# Patient Record
Sex: MALE | Race: WHITE | NOT HISPANIC OR LATINO | Employment: FULL TIME | URBAN - METROPOLITAN AREA
[De-identification: names, ages, dates, MRNs, and addresses within clinical notes are randomized per-mention and may not be internally consistent; named-entity substitution may affect disease eponyms.]

---

## 2023-02-21 LAB
AMPHETAMINE (PRESENCE) IN URINE BY SCREEN METHOD: ABNORMAL
BARBITURATES PRESENCE IN URINE BY SCREEN METHOD: ABNORMAL
BENZODIAZEPINE (PRESENCE) IN URINE BY SCREEN METHOD: ABNORMAL
CANNABINOIDS IN URINE BY SCREEN METHOD: ABNORMAL
COCAINE (PRESENCE) IN URINE BY SCREEN METHOD: ABNORMAL
DRUG SCREEN COMMENT URINE: ABNORMAL
FENTANYL URINE: ABNORMAL
METHADONE (PRESENCE) IN URINE BY SCREEN METHOD: ABNORMAL
OPIATES (PRESENCE) IN URINE BY SCREEN METHOD: ABNORMAL
OXYCODONE (PRESENCE) IN URINE BY SCREEN METHOD: ABNORMAL
PHENCYCLIDINE (PRESENCE) IN URINE BY SCREEN METHOD: ABNORMAL

## 2023-02-27 LAB
AMPHETAMINES,URINE: 2059 NG/ML
MDA,URINE: <200 NG/ML
MDEA,URINE: <200 NG/ML
MDMA,UR: <200 NG/ML
METHAMPHETAMINE QUANTITATIVE URINE: <200 NG/ML
PHENTERMINE,UR: <200 NG/ML

## 2023-11-11 PROBLEM — F17.210 CIGARETTE NICOTINE DEPENDENCE WITHOUT COMPLICATION: Status: ACTIVE | Noted: 2023-11-11

## 2023-11-11 PROBLEM — G93.40 ENCEPHALOPATHY: Status: ACTIVE | Noted: 2023-11-11

## 2023-11-11 PROBLEM — F17.200 CURRENT EVERY DAY SMOKER: Status: ACTIVE | Noted: 2023-11-11

## 2023-11-11 PROBLEM — S82.132A CLOSED FRACTURE OF MEDIAL PLATEAU OF LEFT TIBIA: Status: ACTIVE | Noted: 2023-11-11

## 2023-11-11 PROBLEM — E66.3 OVERWEIGHT WITH BODY MASS INDEX (BMI) OF 25 TO 25.9 IN ADULT: Status: ACTIVE | Noted: 2023-11-11

## 2023-11-11 PROBLEM — I10 BENIGN ESSENTIAL HYPERTENSION: Status: ACTIVE | Noted: 2023-11-11

## 2023-11-11 PROBLEM — G47.00 INSOMNIA: Status: ACTIVE | Noted: 2023-11-11

## 2023-11-11 PROBLEM — F41.1 GENERALIZED ANXIETY DISORDER: Status: ACTIVE | Noted: 2023-11-11

## 2023-11-11 PROBLEM — S32.409D: Status: ACTIVE | Noted: 2023-11-11

## 2023-11-11 PROBLEM — F98.8 ADD (ATTENTION DEFICIT DISORDER): Status: ACTIVE | Noted: 2023-11-11

## 2023-11-11 PROBLEM — F90.0 ATTENTION DEFICIT HYPERACTIVITY DISORDER (ADHD), PREDOMINANTLY INATTENTIVE TYPE: Status: ACTIVE | Noted: 2023-11-11

## 2023-11-11 RX ORDER — DOCUSATE SODIUM 100 MG/1
100 CAPSULE, LIQUID FILLED ORAL 2 TIMES DAILY
COMMUNITY
Start: 2018-05-27 | End: 2024-02-23 | Stop reason: WASHOUT

## 2023-11-11 RX ORDER — ACETAMINOPHEN 500 MG
1 TABLET ORAL NIGHTLY
COMMUNITY
Start: 2017-04-03

## 2023-11-11 RX ORDER — TEMAZEPAM 15 MG/1
1-2 CAPSULE ORAL NIGHTLY PRN
COMMUNITY
Start: 2015-11-09 | End: 2024-02-23 | Stop reason: SDUPTHER

## 2023-11-11 RX ORDER — METOPROLOL SUCCINATE 50 MG/1
TABLET, EXTENDED RELEASE ORAL
COMMUNITY
Start: 2023-03-03 | End: 2024-02-19 | Stop reason: SDUPTHER

## 2023-11-11 RX ORDER — PAROXETINE 30 MG/1
1 TABLET, FILM COATED ORAL DAILY
COMMUNITY
Start: 2022-09-21 | End: 2024-02-23 | Stop reason: SDUPTHER

## 2023-11-11 RX ORDER — BISOPROLOL FUMARATE 10 MG/1
1 TABLET, FILM COATED ORAL DAILY
COMMUNITY
Start: 2020-09-10 | End: 2024-02-23 | Stop reason: ALTCHOICE

## 2023-11-11 RX ORDER — DEXTROAMPHETAMINE SACCHARATE, AMPHETAMINE ASPARTATE, DEXTROAMPHETAMINE SULFATE AND AMPHETAMINE SULFATE 5; 5; 5; 5 MG/1; MG/1; MG/1; MG/1
1.5 TABLET ORAL DAILY
COMMUNITY
Start: 2020-11-10 | End: 2023-11-27 | Stop reason: SDUPTHER

## 2023-11-21 RX ORDER — CEFUROXIME AXETIL 250 MG/1
250 TABLET ORAL EVERY 12 HOURS
COMMUNITY
Start: 2023-08-31 | End: 2024-02-23 | Stop reason: WASHOUT

## 2023-11-21 RX ORDER — ONDANSETRON 4 MG/1
4 TABLET, ORALLY DISINTEGRATING ORAL 3 TIMES DAILY
COMMUNITY
Start: 2023-08-29 | End: 2024-02-23 | Stop reason: WASHOUT

## 2023-11-27 ENCOUNTER — OFFICE VISIT (OUTPATIENT)
Dept: NEUROLOGY | Facility: CLINIC | Age: 38
End: 2023-11-27
Payer: COMMERCIAL

## 2023-11-27 VITALS
SYSTOLIC BLOOD PRESSURE: 124 MMHG | BODY MASS INDEX: 27.11 KG/M2 | DIASTOLIC BLOOD PRESSURE: 86 MMHG | WEIGHT: 183 LBS | HEART RATE: 105 BPM | HEIGHT: 69 IN

## 2023-11-27 DIAGNOSIS — F98.8 ADD (ATTENTION DEFICIT DISORDER) WITHOUT HYPERACTIVITY: ICD-10-CM

## 2023-11-27 DIAGNOSIS — F90.0 ATTENTION DEFICIT HYPERACTIVITY DISORDER (ADHD), PREDOMINANTLY INATTENTIVE TYPE: ICD-10-CM

## 2023-11-27 DIAGNOSIS — G47.01 INSOMNIA DUE TO MEDICAL CONDITION: ICD-10-CM

## 2023-11-27 DIAGNOSIS — G93.40 ENCEPHALOPATHY: Primary | ICD-10-CM

## 2023-11-27 DIAGNOSIS — F41.1 GENERALIZED ANXIETY DISORDER: ICD-10-CM

## 2023-11-27 PROCEDURE — 99214 OFFICE O/P EST MOD 30 MIN: CPT | Performed by: PSYCHIATRY & NEUROLOGY

## 2023-11-27 PROCEDURE — 3079F DIAST BP 80-89 MM HG: CPT | Performed by: PSYCHIATRY & NEUROLOGY

## 2023-11-27 PROCEDURE — 3074F SYST BP LT 130 MM HG: CPT | Performed by: PSYCHIATRY & NEUROLOGY

## 2023-11-27 PROCEDURE — 1036F TOBACCO NON-USER: CPT | Performed by: PSYCHIATRY & NEUROLOGY

## 2023-11-27 RX ORDER — DEXTROAMPHETAMINE SACCHARATE, AMPHETAMINE ASPARTATE, DEXTROAMPHETAMINE SULFATE AND AMPHETAMINE SULFATE 5; 5; 5; 5 MG/1; MG/1; MG/1; MG/1
1.5 TABLET ORAL DAILY
Qty: 45 TABLET | Refills: 0 | Status: SHIPPED | OUTPATIENT
Start: 2023-11-27 | End: 2024-02-21 | Stop reason: SDUPTHER

## 2023-11-27 RX ORDER — DEXTROAMPHETAMINE SACCHARATE, AMPHETAMINE ASPARTATE, DEXTROAMPHETAMINE SULFATE AND AMPHETAMINE SULFATE 5; 5; 5; 5 MG/1; MG/1; MG/1; MG/1
30 TABLET ORAL DAILY
Qty: 45 TABLET | Refills: 0 | Status: SHIPPED | OUTPATIENT
Start: 2023-11-27 | End: 2023-11-27 | Stop reason: SDUPTHER

## 2023-11-27 RX ORDER — DEXTROAMPHETAMINE SACCHARATE, AMPHETAMINE ASPARTATE, DEXTROAMPHETAMINE SULFATE AND AMPHETAMINE SULFATE 5; 5; 5; 5 MG/1; MG/1; MG/1; MG/1
30 TABLET ORAL DAILY
Qty: 45 TABLET | Refills: 0 | Status: SHIPPED | OUTPATIENT
Start: 2023-11-27 | End: 2024-02-21 | Stop reason: ALTCHOICE

## 2023-11-27 RX ORDER — DEXTROAMPHETAMINE SACCHARATE, AMPHETAMINE ASPARTATE, DEXTROAMPHETAMINE SULFATE AND AMPHETAMINE SULFATE 5; 5; 5; 5 MG/1; MG/1; MG/1; MG/1
30 TABLET ORAL DAILY
Qty: 45 TABLET | Refills: 0 | Status: SHIPPED | OUTPATIENT
Start: 2023-11-27 | End: 2024-02-21 | Stop reason: SDUPTHER

## 2023-11-27 ASSESSMENT — ENCOUNTER SYMPTOMS
NAUSEA: 0
BACK PAIN: 0
FATIGUE: 0
DIZZINESS: 0
HEADACHES: 0
PALPITATIONS: 0
SLEEP DISTURBANCE: 0
HYPERACTIVE: 0
ARTHRALGIAS: 0
TREMORS: 0
DIFFICULTY URINATING: 0
ABDOMINAL PAIN: 0
COUGH: 0
FREQUENCY: 0
SEIZURES: 0
HALLUCINATIONS: 0
JOINT SWELLING: 0
NECK STIFFNESS: 0
WEAKNESS: 0
BRUISES/BLEEDS EASILY: 0
SPEECH DIFFICULTY: 0
SINUS PRESSURE: 0
FEVER: 0
LIGHT-HEADEDNESS: 0
NECK PAIN: 0
AGITATION: 0
PHOTOPHOBIA: 0
SHORTNESS OF BREATH: 0
CONFUSION: 0
NUMBNESS: 0
FACIAL ASYMMETRY: 0
ADENOPATHY: 0
EYE PAIN: 0
TROUBLE SWALLOWING: 0
UNEXPECTED WEIGHT CHANGE: 0
WHEEZING: 0
VOMITING: 0

## 2023-11-27 ASSESSMENT — PATIENT HEALTH QUESTIONNAIRE - PHQ9
1. LITTLE INTEREST OR PLEASURE IN DOING THINGS: NOT AT ALL
SUM OF ALL RESPONSES TO PHQ9 QUESTIONS 1 & 2: 0
2. FEELING DOWN, DEPRESSED OR HOPELESS: NOT AT ALL

## 2023-11-27 NOTE — PROGRESS NOTES
Navin Rios  38 y.o.       SUBJECTIVE    HPI   Navin saldana he is a 38-year-old young man who was seen today for follow-up of his encephalopathy due to attention deficit disorder with difficulty at work and home.  Since last seen he has done very well on Adderall 30 mg daily and can tell a big difference when he is on medicine compared to off medicine.  No side effects of the medication with today's physical and neurological examination was normal.  I would like to continue the medication the way he is taking and have discussed the controlled substance policy, abusive potential, risk benefit and the precautions to be taken and depending on how he does I might make future recommendation when he comes back to see me in 3 months.  I did review the medication list.      Due to technical limitations of voice recognition and human error, this note may not accurately reflect the care of the patient.    Review of Systems   Constitutional:  Negative for fatigue, fever and unexpected weight change.   HENT:  Negative for dental problem, ear pain, hearing loss, sinus pressure, tinnitus and trouble swallowing.    Eyes:  Negative for photophobia, pain and visual disturbance.   Respiratory:  Negative for cough, shortness of breath and wheezing.    Cardiovascular:  Negative for chest pain, palpitations and leg swelling.   Gastrointestinal:  Negative for abdominal pain, nausea and vomiting.   Genitourinary:  Negative for difficulty urinating, enuresis and frequency.   Musculoskeletal:  Negative for arthralgias, back pain, joint swelling, neck pain and neck stiffness.   Skin:  Negative for pallor and rash.   Allergic/Immunologic: Negative for food allergies.   Neurological:  Negative for dizziness, tremors, seizures, syncope, facial asymmetry, speech difficulty, weakness, light-headedness, numbness and headaches.   Hematological:  Negative for adenopathy. Does not bruise/bleed easily.   Psychiatric/Behavioral:  Negative for  "agitation, behavioral problems, confusion, hallucinations and sleep disturbance. The patient is not hyperactive.         Patient Active Problem List   Diagnosis    ADD (attention deficit disorder)    Attention deficit hyperactivity disorder (ADHD), predominantly inattentive type    Benign essential hypertension    Cigarette nicotine dependence without complication    Closed displaced fracture of acetabulum with routine healing    Closed fracture of medial plateau of left tibia    Current every day smoker    Encephalopathy    Generalized anxiety disorder    Insomnia    Overweight with body mass index (BMI) of 25 to 25.9 in adult     Past Medical History:   Diagnosis Date    Personal history of other mental and behavioral disorders     History of attention deficit hyperactivity disorder (ADHD)     Past Surgical History:   Procedure Laterality Date    OTHER SURGICAL HISTORY  08/09/2016    Oral Surgery       reports that he has quit smoking. His smoking use included cigarettes. He has a 7.50 pack-year smoking history. He has never used smokeless tobacco. Alcohol use questions deferred to the physician. Drug use questions deferred to the physician.    /86   Pulse 105   Ht 1.753 m (5' 9\")   Wt 83 kg (183 lb)   BMI 27.02 kg/m²     OBJECTIVE  Physical Exam/Neurological Exam   Constitutional: General appearance: no acute distress   Auscultation of Heart: Regular rate and rhythm, no murmurs, normal S1 and S2.   Carotid Arteries: Intact without any bruits.   Neck is supple.   No lymph adenopathy.   Peripheral Vascular Exam: Pulses +2 and equal in all extremities. No swelling, varicosities, edema or tenderness to palpations.    Abdomen is soft, nondistended. No organomegaly.  Mental status: The patient was in no distress, alert, interactive and cooperative. Affect is appropriate.   Orientation: oriented to person, oriented to place and oriented to time.   Memory: recent memory intact and remote memory intact. "   Attention: normal attention span and normal concentrating ability.   Language: normal comprehension and no difficulty naming common objects.   Fund of knowledge: Patient displays adequate knowledge of current events, adequate fund of knowledge regarding past history and adequate fund of knowledge regarding vocabulary.   Eyes: The ophthalmoscopic examination was normal. The fundi are visualized with normal disc margins and without.  Cranial nerve II: Visual fields full to confrontation.   Cranial nerves III, IV, and VI: Pupils round, equally reactive to light; no ptosis. EOMs intact. No nystagmus.   Cranial Nerve V: Facial sensation intact bilaterally.   Cranial nerve VII: Normal and symmetric facial strength.   Cranial nerve VIII: Hearing is intact bilaterally to finger rub / whisper.   Cranial nerves IX and X: Palate elevates symmetrically.   Cranial nerve XI: Shoulder shrug and neck rotation strength are intact.   Cranial nerve XII: Tongue midline with normal strength.   Motor: Motor exam was normal. Muscle bulk was normal in both upper and lower extremities. Muscle tone was normal in both upper and lower extremities. Muscle strength was 5/5 throughout. no abnormal or adventitious movements were present.   Deep Tendon Reflexes: left biceps 2+ , right biceps 2+, left triceps 2+, right triceps 2+, left brachioradialis 2+, right brachioradialis 2+, left patella 2+, right patella 2+, left ankle jerk 2+, right ankle jerk 2+   Plantar Reflex: Toes downgoing to plantar stimulation on the left. Toes downgoing to plantar stimulation on the right.   Sensory Exam: Normal to light touch.   Coordination: There is no limb dystaxia and rapid alternating movements are intact.  Gait: Gait is normal without spasticity, ataxia or bradykinesia. Stance is stable with a negative Romberg.      ASSESSMENT/PLAN  Diagnoses and all orders for this visit:  ADD (attention deficit disorder) without hyperactivity  -      amphetamine-dextroamphetamine (Adderall) 20 mg tablet; Take 1.5 tablets (30 mg) by mouth once daily.  am, 1 po 1 pm.  -     amphetamine-dextroamphetamine (Adderall) 20 mg tablet; Take 1.5 tablets (30 mg) by mouth once daily.  -     amphetamine-dextroamphetamine (Adderall) 20 mg tablet; Take 1.5 tablets (30 mg) by mouth once daily.        Jacob Del Rosario MD  11/27/2023  2:44 PM

## 2023-12-02 DIAGNOSIS — G47.00 INSOMNIA, UNSPECIFIED: ICD-10-CM

## 2023-12-04 RX ORDER — TEMAZEPAM 15 MG/1
CAPSULE ORAL
Qty: 60 CAPSULE | Refills: 2 | OUTPATIENT
Start: 2023-12-04

## 2024-02-19 DIAGNOSIS — I10 BENIGN ESSENTIAL HYPERTENSION: ICD-10-CM

## 2024-02-19 DIAGNOSIS — I10 ESSENTIAL (PRIMARY) HYPERTENSION: ICD-10-CM

## 2024-02-19 RX ORDER — METOPROLOL SUCCINATE 50 MG/1
50 TABLET, EXTENDED RELEASE ORAL DAILY
Qty: 30 TABLET | Refills: 0 | Status: SHIPPED | OUTPATIENT
Start: 2024-02-19 | End: 2024-02-23 | Stop reason: SDUPTHER

## 2024-02-19 NOTE — PROGRESS NOTES
Malinda Rios is a 38 y.o.   male.  HPI  The patient is being seen today for their encephalopathy r/t their ADHD. They are currently taking Adderall 20mg BID, 1.5 tabs (30mg) in am and 1 tab (20mg) afternoon and it has been effective. The patient can tell when the medication wears off. The patient agrees that their quality of life has improved while taking this medication. Patient denies any chest pain, heart palpitations, sleep issues, appetite changes, weight loss, and mood changes while taking this medication. Neurological exam is normal. I have reviewed the medications and the chart. Review of systems are negative unless otherwise specified in HPI.    I have personally reviewed the OAR[place] report for this patient. I have considered the risks of abuse, dependence, addiction, and diversion. I believe that it is critically appropriate for this patient to be prescribed this medication based on documented diagnosis of ADHD. An updated contract between the patient and myself was signed, scanned into the EMR, and the patient agrees to the terms. Any deviation from this agreement will result in termination from my practice. CSA was signed and dated. UDS obtained, results are pending.   Objective   Neurological Exam    Reflexes  Deep tendon reflexes are 2+ and symmetric in all four extremities.    Gait  Casual gait is normal including stance, stride, and arm swing.    Physical Exam  Cardiovascular:      Rate and Rhythm: Normal rate.   Neurological:      Deep Tendon Reflexes: Reflexes are normal and symmetric.         Assessment/Plan   Discussed following up in 3 months. Medication was sent to pharmacy. Discussed with the patient the purpose of medication, as well as potential side effects to be aware of. Informed the patient about abuse potential of medication and the importance adhering to the controlled substance agreement. Advised patient to call office with any adverse reaction to medication.

## 2024-02-19 NOTE — TELEPHONE ENCOUNTER
Pt has an appt on Wed. 2/21 and he stated that he is out of medication and his BP has been running high.

## 2024-02-21 ENCOUNTER — OFFICE VISIT (OUTPATIENT)
Dept: NEUROLOGY | Facility: CLINIC | Age: 39
End: 2024-02-21
Payer: COMMERCIAL

## 2024-02-21 VITALS
HEIGHT: 68 IN | WEIGHT: 179 LBS | BODY MASS INDEX: 27.13 KG/M2 | DIASTOLIC BLOOD PRESSURE: 80 MMHG | SYSTOLIC BLOOD PRESSURE: 136 MMHG | HEART RATE: 94 BPM

## 2024-02-21 DIAGNOSIS — F90.9 ATTENTION DEFICIT HYPERACTIVITY DISORDER (ADHD), UNSPECIFIED ADHD TYPE: ICD-10-CM

## 2024-02-21 DIAGNOSIS — G93.40 ENCEPHALOPATHY: Primary | ICD-10-CM

## 2024-02-21 LAB
AMPHETAMINES UR QL SCN: NORMAL
BARBITURATES UR QL SCN: NORMAL
BENZODIAZ UR QL SCN: NORMAL
BZE UR QL SCN: NORMAL
CANNABINOIDS UR QL SCN: NORMAL
FENTANYL+NORFENTANYL UR QL SCN: NORMAL
OPIATES UR QL SCN: NORMAL
OXYCODONE+OXYMORPHONE UR QL SCN: NORMAL
PCP UR QL SCN: NORMAL

## 2024-02-21 PROCEDURE — 3075F SYST BP GE 130 - 139MM HG: CPT

## 2024-02-21 PROCEDURE — 80307 DRUG TEST PRSMV CHEM ANLYZR: CPT

## 2024-02-21 PROCEDURE — 3079F DIAST BP 80-89 MM HG: CPT

## 2024-02-21 PROCEDURE — 99214 OFFICE O/P EST MOD 30 MIN: CPT

## 2024-02-21 PROCEDURE — 1036F TOBACCO NON-USER: CPT

## 2024-02-21 RX ORDER — DEXTROAMPHETAMINE SACCHARATE, AMPHETAMINE ASPARTATE, DEXTROAMPHETAMINE SULFATE AND AMPHETAMINE SULFATE 5; 5; 5; 5 MG/1; MG/1; MG/1; MG/1
TABLET ORAL
Qty: 75 TABLET | Refills: 0 | Status: SHIPPED | OUTPATIENT
Start: 2024-02-21 | End: 2024-05-13 | Stop reason: SDUPTHER

## 2024-02-21 RX ORDER — DEXTROAMPHETAMINE SACCHARATE, AMPHETAMINE ASPARTATE, DEXTROAMPHETAMINE SULFATE AND AMPHETAMINE SULFATE 5; 5; 5; 5 MG/1; MG/1; MG/1; MG/1
TABLET ORAL
Qty: 75 TABLET | Refills: 0 | Status: SHIPPED | OUTPATIENT
Start: 2024-04-20 | End: 2024-05-13 | Stop reason: SDUPTHER

## 2024-02-21 RX ORDER — DEXTROAMPHETAMINE SACCHARATE, AMPHETAMINE ASPARTATE, DEXTROAMPHETAMINE SULFATE AND AMPHETAMINE SULFATE 5; 5; 5; 5 MG/1; MG/1; MG/1; MG/1
TABLET ORAL
Qty: 75 TABLET | Refills: 0 | Status: SHIPPED | OUTPATIENT
Start: 2024-03-20 | End: 2024-05-13 | Stop reason: SDUPTHER

## 2024-02-21 ASSESSMENT — PATIENT HEALTH QUESTIONNAIRE - PHQ9
1. LITTLE INTEREST OR PLEASURE IN DOING THINGS: NOT AT ALL
2. FEELING DOWN, DEPRESSED OR HOPELESS: NOT AT ALL
SUM OF ALL RESPONSES TO PHQ9 QUESTIONS 1 & 2: 0

## 2024-02-22 PROBLEM — Z86.59 HISTORY OF MENTAL DISORDER: Status: ACTIVE | Noted: 2024-02-22

## 2024-02-22 PROBLEM — M25.569 KNEE PAIN: Status: ACTIVE | Noted: 2024-02-22

## 2024-02-22 PROBLEM — S82.133A: Status: ACTIVE | Noted: 2023-11-11

## 2024-02-22 PROBLEM — D49.2 SKIN NEOPLASM: Status: ACTIVE | Noted: 2024-02-22

## 2024-02-22 PROBLEM — S32.409A CLOSED FRACTURE OF ACETABULUM (MULTI): Status: ACTIVE | Noted: 2023-11-11

## 2024-02-22 PROBLEM — F17.200 NICOTINE DEPENDENCE: Status: ACTIVE | Noted: 2023-11-11

## 2024-02-22 PROBLEM — G47.01 INSOMNIA DUE TO MEDICAL CONDITION: Status: ACTIVE | Noted: 2023-11-11

## 2024-02-22 PROBLEM — K29.70 GASTRITIS: Status: ACTIVE | Noted: 2024-02-22

## 2024-02-22 PROBLEM — G93.9 DISORDER OF BRAIN: Status: ACTIVE | Noted: 2023-11-11

## 2024-02-22 PROBLEM — J01.00 ACUTE MAXILLARY SINUSITIS: Status: ACTIVE | Noted: 2024-02-22

## 2024-02-22 RX ORDER — NAPROXEN 500 MG/1
TABLET ORAL EVERY 12 HOURS
COMMUNITY
Start: 2018-10-02 | End: 2024-05-24 | Stop reason: SDUPTHER

## 2024-02-22 RX ORDER — METOPROLOL SUCCINATE 50 MG/1
50 TABLET, EXTENDED RELEASE ORAL DAILY
Qty: 90 TABLET | Refills: 1 | OUTPATIENT
Start: 2024-02-22

## 2024-02-22 RX ORDER — OMEPRAZOLE 20 MG/1
CAPSULE, DELAYED RELEASE ORAL
COMMUNITY
Start: 2018-10-02 | End: 2024-02-23 | Stop reason: WASHOUT

## 2024-02-22 RX ORDER — BUPROPION HYDROCHLORIDE 300 MG/1
TABLET ORAL
COMMUNITY
Start: 2021-03-26 | End: 2024-02-23 | Stop reason: ALTCHOICE

## 2024-02-23 ENCOUNTER — OFFICE VISIT (OUTPATIENT)
Dept: PRIMARY CARE | Facility: CLINIC | Age: 39
End: 2024-02-23
Payer: COMMERCIAL

## 2024-02-23 VITALS
BODY MASS INDEX: 27.13 KG/M2 | DIASTOLIC BLOOD PRESSURE: 72 MMHG | WEIGHT: 179 LBS | HEIGHT: 68 IN | SYSTOLIC BLOOD PRESSURE: 120 MMHG | HEART RATE: 106 BPM | TEMPERATURE: 98 F

## 2024-02-23 DIAGNOSIS — I10 BENIGN ESSENTIAL HYPERTENSION: Primary | ICD-10-CM

## 2024-02-23 DIAGNOSIS — G47.01 INSOMNIA DUE TO MEDICAL CONDITION: ICD-10-CM

## 2024-02-23 DIAGNOSIS — F41.1 GENERALIZED ANXIETY DISORDER: ICD-10-CM

## 2024-02-23 PROCEDURE — 1036F TOBACCO NON-USER: CPT | Performed by: FAMILY MEDICINE

## 2024-02-23 PROCEDURE — 3078F DIAST BP <80 MM HG: CPT | Performed by: FAMILY MEDICINE

## 2024-02-23 PROCEDURE — 99214 OFFICE O/P EST MOD 30 MIN: CPT | Performed by: FAMILY MEDICINE

## 2024-02-23 PROCEDURE — 3074F SYST BP LT 130 MM HG: CPT | Performed by: FAMILY MEDICINE

## 2024-02-23 RX ORDER — METOPROLOL SUCCINATE 50 MG/1
50 TABLET, EXTENDED RELEASE ORAL 2 TIMES DAILY
Qty: 180 TABLET | Refills: 1 | Status: SHIPPED | OUTPATIENT
Start: 2024-02-23 | End: 2024-03-18 | Stop reason: SDUPTHER

## 2024-02-23 RX ORDER — TEMAZEPAM 15 MG/1
30 CAPSULE ORAL NIGHTLY PRN
Qty: 60 CAPSULE | Refills: 2 | Status: SHIPPED | OUTPATIENT
Start: 2024-02-23 | End: 2024-05-24 | Stop reason: SDUPTHER

## 2024-02-23 RX ORDER — PAROXETINE 30 MG/1
60 TABLET, FILM COATED ORAL DAILY
Qty: 180 TABLET | Refills: 1 | Status: SHIPPED | OUTPATIENT
Start: 2024-02-23 | End: 2025-02-22

## 2024-02-23 NOTE — PROGRESS NOTES
"    /72   Pulse 106   Temp 36.7 °C (98 °F)   Ht 1.727 m (5' 8\")   Wt 81.2 kg (179 lb)   BMI 27.22 kg/m²     Past Medical History:   Diagnosis Date    Personal history of other mental and behavioral disorders     History of attention deficit hyperactivity disorder (ADHD)       Patient Active Problem List   Diagnosis    ADD (attention deficit disorder)    Attention deficit hyperactivity disorder (ADHD), predominantly inattentive type    Benign essential hypertension    Nicotine dependence    Closed fracture of acetabulum (CMS/HCC)    Closed fracture of medial condyle of tibia    Current every day smoker    Disorder of brain    Generalized anxiety disorder    Insomnia due to medical condition    Overweight with body mass index (BMI) of 25 to 25.9 in adult    Acute maxillary sinusitis    Gastritis    History of mental disorder    Knee pain    Skin neoplasm       Current Outpatient Medications   Medication Sig Dispense Refill    amphetamine-dextroamphetamine (Adderall) 20 mg tablet Take 1.5 tabs (30mg) by mouth in am and 1 tab (20mg) by mouth in afternoon. 75 tablet 0    [START ON 3/20/2024] amphetamine-dextroamphetamine (Adderall) 20 mg tablet Take 1.5 tabs (30mg) by mouth in am and 1 tab (20mg) by mouth in afternoon. Do not start before March 20, 2024. 75 tablet 0    [START ON 4/20/2024] amphetamine-dextroamphetamine (Adderall) 20 mg tablet Take 1.5 tabs (30mg) by mouth in am and 1 tab (20mg) by mouth in afternoon. Do not start before April 20, 2024. 75 tablet 0    bisoprolol (Zebeta) 10 mg tablet Take 1 tablet (10 mg) by mouth once daily.      buPROPion XL (Wellbutrin XL) 300 mg 24 hr tablet Take by mouth.      melatonin 5 mg tablet Take 1 tablet (5 mg) by mouth once daily at bedtime.      metoprolol succinate XL (Toprol-XL) 50 mg 24 hr tablet Take 1 tablet (50 mg) by mouth once daily. 30 tablet 0    naproxen (Naprosyn) 500 mg tablet Take by mouth every 12 hours.      PARoxetine (Paxil) 30 mg tablet Take 1 " tablet (30 mg) by mouth once daily. AS DIRECTED.      sertraline HCl (ZOLOFT ORAL) Take by mouth once daily.      temazepam (Restoril) 15 mg capsule Take 1-2 capsules (15-30 mg) by mouth as needed at bedtime for sleep.      cefuroxime (Ceftin) 250 mg tablet Take 1 tablet (250 mg) by mouth every 12 hours.      docusate sodium (Colace) 100 mg capsule Take 1 capsule (100 mg) by mouth twice a day.      LISINOPRIL ORAL Take by mouth once daily.      omeprazole (PriLOSEC) 20 mg DR capsule Take by mouth once daily.      ondansetron ODT (Zofran-ODT) 4 mg disintegrating tablet Take 1 tablet (4 mg) by mouth 3 times a day.       No current facility-administered medications for this visit.       CC/HPI/ASSESSMENT/PLAN    CC follow-up medication    HPI patient suffers from hypertension generalized anxiety disorder ADHD and insomnia.  Patient uses temazepam at night for instance insomnia with good results.  OAR[place] reports been reviewed found in chart.  Have considered risk of abuse dependence addiction diversion.  No concerns.  Controlled substance agreement form is signed urine testing recently done reviewed.  Patient using Paxil 30 mg 2 tabs daily for his anxiety with good results requesting refill.  Blood pressure has been under reasonable control but patient notes some days it is elevated.  We will increase metoprolol 50 mg twice daily.  He remains on Adderall for his ADHD.  He sees neurology.  Denies fever chills chest pain short of breath.  ROS negative septa noted above past medical/surgical history is reviewed    Exam calm eyes no jaundice neck supple lungs CTA CV RRR neuro alert oriented CN II through intact psych calm pleasant male no psychosis    A/P 1.  Hypertension will use metoprolol 50 mg twice daily monitor blood pressure at home 2.  Generalized anxiety disorder stable Paxil 60 mg daily dose refilled.  #3 insomnia chronic stable.  Temazepam refilled.  OARRS report reviewed as noted above.  Follow-up 3  months    There are no diagnoses linked to this encounter.

## 2024-03-15 ENCOUNTER — APPOINTMENT (OUTPATIENT)
Dept: PRIMARY CARE | Facility: CLINIC | Age: 39
End: 2024-03-15
Payer: COMMERCIAL

## 2024-03-18 DIAGNOSIS — I10 BENIGN ESSENTIAL HYPERTENSION: ICD-10-CM

## 2024-03-18 RX ORDER — METOPROLOL SUCCINATE 50 MG/1
50 TABLET, EXTENDED RELEASE ORAL 2 TIMES DAILY
Qty: 180 TABLET | Refills: 0 | Status: SHIPPED | OUTPATIENT
Start: 2024-03-18 | End: 2024-05-24 | Stop reason: WASHOUT

## 2024-05-09 NOTE — PROGRESS NOTES
Malinda Rios is a 38 y.o.   male.-quit smoking  HPI  The patient is being seen today for their encephalopathy r/t their ADHD. They are currently taking  Adderall 1.5 tabs in am, and 1 tab in pm and it has been effective. The patient can tell when the medication wears off, usually 6-6:30pm. The patient agrees that their quality of life has improved while taking this medication. Patient denies any chest pain, heart palpitations, sleep issues, appetite changes, weight loss, and mood changes while taking this medication. Neurological exam is normal. I have reviewed the medications and the chart. Review of systems are negative unless otherwise specified in HPI.    Objective   Neurological Exam  Mental Status  Awake, alert and oriented to person, place and time. Speech is normal. Language is fluent with no aphasia. Attention and concentration are normal.    Cranial Nerves  CN III, IV, VI: Pupils equal round and reactive to light bilaterally.  And EOM's Normal.    Motor   Strength is 5/5 throughout all four extremities.    Sensory  Light touch is normal in upper and lower extremities.     Reflexes  Deep tendon reflexes are 2+ and symmetric in all four extremities.    Gait  Casual gait is normal including stance, stride, and arm swing.Normal toe walking. Normal heel walking.    Physical Exam  Eyes:      Pupils: Pupils are equal, round, and reactive to light.   Cardiovascular:      Rate and Rhythm: Normal rate.   Neurological:      Motor: Motor strength is normal.     Deep Tendon Reflexes: Reflexes are normal and symmetric.   Psychiatric:         Speech: Speech normal.         Assessment/Plan   Discussed following up in 3 months. Medication was sent to pharmacy. Discussed with the patient the purpose of medication, as well as potential side effects to be aware of. Informed the patient about abuse potential of medication and the importance adhering to the controlled substance agreement. Advised patient to call  office with any adverse reaction to medication.       CALORIE COUNT/Registered Dietitian

## 2024-05-13 ENCOUNTER — OFFICE VISIT (OUTPATIENT)
Dept: NEUROLOGY | Facility: CLINIC | Age: 39
End: 2024-05-13
Payer: COMMERCIAL

## 2024-05-13 VITALS
SYSTOLIC BLOOD PRESSURE: 118 MMHG | WEIGHT: 186.6 LBS | DIASTOLIC BLOOD PRESSURE: 84 MMHG | HEART RATE: 80 BPM | HEIGHT: 68 IN | BODY MASS INDEX: 28.28 KG/M2

## 2024-05-13 DIAGNOSIS — G93.40 ENCEPHALOPATHY: Primary | ICD-10-CM

## 2024-05-13 DIAGNOSIS — F90.9 ATTENTION DEFICIT HYPERACTIVITY DISORDER (ADHD), UNSPECIFIED ADHD TYPE: ICD-10-CM

## 2024-05-13 PROCEDURE — 99214 OFFICE O/P EST MOD 30 MIN: CPT

## 2024-05-13 PROCEDURE — 3079F DIAST BP 80-89 MM HG: CPT

## 2024-05-13 PROCEDURE — 3074F SYST BP LT 130 MM HG: CPT

## 2024-05-13 RX ORDER — DEXTROAMPHETAMINE SACCHARATE, AMPHETAMINE ASPARTATE, DEXTROAMPHETAMINE SULFATE AND AMPHETAMINE SULFATE 5; 5; 5; 5 MG/1; MG/1; MG/1; MG/1
TABLET ORAL
Qty: 75 TABLET | Refills: 0 | Status: SHIPPED | OUTPATIENT
Start: 2024-06-12

## 2024-05-13 RX ORDER — DEXTROAMPHETAMINE SACCHARATE, AMPHETAMINE ASPARTATE, DEXTROAMPHETAMINE SULFATE AND AMPHETAMINE SULFATE 5; 5; 5; 5 MG/1; MG/1; MG/1; MG/1
TABLET ORAL
Qty: 75 TABLET | Refills: 0 | Status: SHIPPED | OUTPATIENT
Start: 2024-05-13

## 2024-05-13 RX ORDER — DEXTROAMPHETAMINE SACCHARATE, AMPHETAMINE ASPARTATE, DEXTROAMPHETAMINE SULFATE AND AMPHETAMINE SULFATE 5; 5; 5; 5 MG/1; MG/1; MG/1; MG/1
TABLET ORAL
Qty: 75 TABLET | Refills: 0 | Status: SHIPPED | OUTPATIENT
Start: 2024-07-12

## 2024-05-24 ENCOUNTER — OFFICE VISIT (OUTPATIENT)
Dept: PRIMARY CARE | Facility: CLINIC | Age: 39
End: 2024-05-24
Payer: COMMERCIAL

## 2024-05-24 VITALS
HEART RATE: 150 BPM | DIASTOLIC BLOOD PRESSURE: 90 MMHG | SYSTOLIC BLOOD PRESSURE: 170 MMHG | HEIGHT: 68 IN | BODY MASS INDEX: 28.52 KG/M2 | WEIGHT: 188.2 LBS | TEMPERATURE: 98 F

## 2024-05-24 DIAGNOSIS — G47.01 INSOMNIA DUE TO MEDICAL CONDITION: ICD-10-CM

## 2024-05-24 DIAGNOSIS — J01.00 ACUTE NON-RECURRENT MAXILLARY SINUSITIS: ICD-10-CM

## 2024-05-24 DIAGNOSIS — R21 RASH: ICD-10-CM

## 2024-05-24 DIAGNOSIS — M25.569 KNEE PAIN, UNSPECIFIED CHRONICITY, UNSPECIFIED LATERALITY: ICD-10-CM

## 2024-05-24 DIAGNOSIS — E55.9 VITAMIN D DEFICIENCY: ICD-10-CM

## 2024-05-24 DIAGNOSIS — I10 BENIGN ESSENTIAL HYPERTENSION: Primary | ICD-10-CM

## 2024-05-24 PROCEDURE — 99214 OFFICE O/P EST MOD 30 MIN: CPT | Performed by: FAMILY MEDICINE

## 2024-05-24 PROCEDURE — 3077F SYST BP >= 140 MM HG: CPT | Performed by: FAMILY MEDICINE

## 2024-05-24 PROCEDURE — 3080F DIAST BP >= 90 MM HG: CPT | Performed by: FAMILY MEDICINE

## 2024-05-24 RX ORDER — CEFUROXIME AXETIL 250 MG/1
250 TABLET ORAL 2 TIMES DAILY
Qty: 20 TABLET | Refills: 0 | Status: SHIPPED | OUTPATIENT
Start: 2024-05-24 | End: 2024-06-03

## 2024-05-24 RX ORDER — PREDNISONE 10 MG/1
TABLET ORAL
Qty: 30 TABLET | Refills: 0 | Status: SHIPPED | OUTPATIENT
Start: 2024-05-24

## 2024-05-24 RX ORDER — TEMAZEPAM 15 MG/1
30 CAPSULE ORAL NIGHTLY PRN
Qty: 60 CAPSULE | Refills: 2 | Status: SHIPPED | OUTPATIENT
Start: 2024-05-24 | End: 2024-11-20

## 2024-05-24 RX ORDER — NAPROXEN 500 MG/1
500 TABLET ORAL DAILY
Qty: 90 TABLET | Refills: 1 | Status: SHIPPED | OUTPATIENT
Start: 2024-05-24 | End: 2025-05-24

## 2024-05-24 RX ORDER — METOPROLOL SUCCINATE 50 MG/1
50 TABLET, EXTENDED RELEASE ORAL 2 TIMES DAILY
Qty: 180 TABLET | Refills: 1 | Status: SHIPPED | OUTPATIENT
Start: 2024-05-24 | End: 2024-11-20

## 2024-05-24 ASSESSMENT — PATIENT HEALTH QUESTIONNAIRE - PHQ9
2. FEELING DOWN, DEPRESSED OR HOPELESS: NOT AT ALL
SUM OF ALL RESPONSES TO PHQ9 QUESTIONS 1 AND 2: 0
1. LITTLE INTEREST OR PLEASURE IN DOING THINGS: NOT AT ALL

## 2024-05-24 NOTE — PROGRESS NOTES
"    /90   Pulse (!) 150   Temp 36.7 °C (98 °F)   Ht 1.727 m (5' 8\")   Wt 85.4 kg (188 lb 3.2 oz)   BMI 28.62 kg/m²     Past Medical History:   Diagnosis Date    ADHD (attention deficit hyperactivity disorder) 2001    Anxiety 2010    Hypertension 2010    Personal history of other mental and behavioral disorders     History of attention deficit hyperactivity disorder (ADHD)       Patient Active Problem List   Diagnosis    ADD (attention deficit disorder)    Attention deficit hyperactivity disorder (ADHD), predominantly inattentive type    Benign essential hypertension    Nicotine dependence    Closed fracture of acetabulum (Multi)    Closed fracture of medial condyle of tibia    Current every day smoker    Disorder of brain    Generalized anxiety disorder    Insomnia due to medical condition    Overweight with body mass index (BMI) of 25 to 25.9 in adult    Acute maxillary sinusitis    Gastritis    History of mental disorder    Knee pain    Skin neoplasm       Current Outpatient Medications   Medication Sig Dispense Refill    [START ON 7/12/2024] amphetamine-dextroamphetamine (Adderall) 20 mg tablet Take 1.5 tabs (30mg) by mouth in am and 1 tab (20mg) by mouth in afternoon. Do not fill before July 12, 2024. 75 tablet 0    [START ON 6/12/2024] amphetamine-dextroamphetamine (Adderall) 20 mg tablet Take 1.5 tabs (30mg) by mouth in am and 1 tab (20mg) by mouth in afternoon. Do not fill before June 12, 2024. 75 tablet 0    amphetamine-dextroamphetamine (Adderall) 20 mg tablet Take 1.5 tabs (30mg) by mouth in am and 1 tab (20mg) by mouth in afternoon. 75 tablet 0    melatonin 5 mg tablet Take 1 tablet (5 mg) by mouth once daily at bedtime.      metoprolol succinate XL (Toprol-XL) 50 mg 24 hr tablet TAKE 1 TABLET BY MOUTH EVERY DAY 30 tablet 0    naproxen (Naprosyn) 500 mg tablet Take by mouth every 12 hours.      PARoxetine (Paxil) 30 mg tablet Take 2 tablets (60 mg) by mouth once daily. AS DIRECTED. 180 tablet 1 "    temazepam (Restoril) 15 mg capsule Take 2 capsules (30 mg) by mouth as needed at bedtime for sleep. 60 capsule 2    metoprolol succinate XL (Toprol-XL) 50 mg 24 hr tablet Take 1 tablet (50 mg) by mouth 2 times a day. (Patient not taking: Reported on 5/24/2024) 180 tablet 0     No current facility-administered medications for this visit.       CC/HPI/ASSESSMENT/PLAN    CC follow medication    HPI patient with a history of hypertension chronic insomnia anxiety, ADHD.  Patient notes he developed a rash that is coming gone for couple months.  He notes the rash is located primarily in the medial aspects of the elbow folds as well as behind the knees bilaterally.  He is use over-the-counter cream with some relief.  He notes is very itchy when it comes back.  Patient using temazepam for his insomnia with good results.  OARRS report been reviewed.  Report scanned in chart.  Consider risk of abuse dependence addiction diversion.  No concerns.  There is a signed controlled substance agreement in chart.  Urine drug screen reviewed.  Patient notes over the last couple weeks he is developing worsening sinus congestion with postnasal drainage.  He notes the discharge is thick and green.  His blood pressure at home generally in the 130s over 80s.  He notes he just had a large cup of coffee at [facility].  He takes Adderall daily for his ADHD.  He notes blood pressures been much better since we increase metoprolol to 50 mg twice daily.  He like an order for blood work ROS negative some noted above past medical/history reviewed    Exam calm blood pressure moderately elevated.  Eyes no jaundice neck supple no LAD lungs CTA CV RRR Ext no edema skin exam reveals macular rash diffusely on the medial arms and behind the legs consistent with eczema no evidence of cellulitis.  Neuro alert and oriented CN II 12 intact psych calm pleasant male no psychosis    A/P 1 hypertension.  Continue metoprolol 50 mg twice daily.  He will monitor  blood pressure closely at home.  If blood pressure is elevating we will increase metoprolol dose.  #2 maxillary sinusitis cefuroxime ordered.  #3 insomnia chronic stable OARRS report reviewed.  Temazepam refilled.  Follow-up 3 months.  New substance agreement form and urine drug screen will be ordered at follow-up visit.  #4 rash.  Prednisone as ordered.  Continue over-the-counter cortisone cream.  Follow-up 3 months blood work ordered.    There are no diagnoses linked to this encounter.

## 2024-08-11 NOTE — PROGRESS NOTES
Malinda Rios is a 39 y.o.   male.  HPI  The patient is being seen today for their encephalopathy r/t their ADHD. They are currently taking Adderall 30mg in am and 20mg in pm it has been effective. The patient can tell when the medication wears off. The patient agrees that their quality of life has improved while taking this medication. Patient denies any chest pain, heart palpitations, sleep issues, appetite changes, weight loss, and mood changes while taking this medication. Neurological exam is normal. I have reviewed the medications and the chart. Review of systems are negative unless otherwise specified in HPI.    Objective   Neurological Exam  Mental Status  Awake, alert and oriented to person, place and time. Speech is normal. Language is fluent with no aphasia. Attention and concentration are normal.    Cranial Nerves  CN III, IV, VI: Pupils equal round and reactive to light bilaterally.  And EOM's Normal.    Motor   Strength is 5/5 throughout all four extremities.    Sensory  Light touch is normal in upper and lower extremities.     Reflexes  Deep tendon reflexes are 2+ and symmetric in all four extremities.    Gait  Casual gait is normal including stance, stride, and arm swing.Normal toe walking. Normal heel walking.    Physical Exam  Eyes:      Pupils: Pupils are equal, round, and reactive to light.   Neurological:      Motor: Motor strength is normal.     Deep Tendon Reflexes: Reflexes are normal and symmetric.   Psychiatric:         Speech: Speech normal.           Assessment/Plan   Discussed following up in 3 months. Medication was sent to pharmacy. Discussed with the patient the purpose of medication, as well as potential side effects to be aware of. Informed the patient about abuse potential of medication and the importance adhering to the controlled substance agreement. Advised patient to call office with any adverse reaction to medication.

## 2024-08-12 ENCOUNTER — APPOINTMENT (OUTPATIENT)
Dept: NEUROLOGY | Facility: CLINIC | Age: 39
End: 2024-08-12
Payer: COMMERCIAL

## 2024-08-12 VITALS
HEIGHT: 68 IN | DIASTOLIC BLOOD PRESSURE: 98 MMHG | BODY MASS INDEX: 25.49 KG/M2 | SYSTOLIC BLOOD PRESSURE: 138 MMHG | HEART RATE: 135 BPM | WEIGHT: 168.2 LBS

## 2024-08-12 DIAGNOSIS — G93.40 ENCEPHALOPATHY: Primary | ICD-10-CM

## 2024-08-12 DIAGNOSIS — F90.9 ATTENTION DEFICIT HYPERACTIVITY DISORDER (ADHD), UNSPECIFIED ADHD TYPE: ICD-10-CM

## 2024-08-12 PROCEDURE — 99214 OFFICE O/P EST MOD 30 MIN: CPT

## 2024-08-12 PROCEDURE — 3075F SYST BP GE 130 - 139MM HG: CPT

## 2024-08-12 PROCEDURE — 3080F DIAST BP >= 90 MM HG: CPT

## 2024-08-12 PROCEDURE — 3008F BODY MASS INDEX DOCD: CPT

## 2024-08-12 RX ORDER — DEXTROAMPHETAMINE SACCHARATE, AMPHETAMINE ASPARTATE, DEXTROAMPHETAMINE SULFATE AND AMPHETAMINE SULFATE 5; 5; 5; 5 MG/1; MG/1; MG/1; MG/1
TABLET ORAL
Qty: 75 TABLET | Refills: 0 | Status: SHIPPED | OUTPATIENT
Start: 2024-09-11

## 2024-08-12 RX ORDER — DEXTROAMPHETAMINE SACCHARATE, AMPHETAMINE ASPARTATE, DEXTROAMPHETAMINE SULFATE AND AMPHETAMINE SULFATE 5; 5; 5; 5 MG/1; MG/1; MG/1; MG/1
TABLET ORAL
Qty: 75 TABLET | Refills: 0 | Status: SHIPPED | OUTPATIENT
Start: 2024-10-11

## 2024-08-12 RX ORDER — DEXTROAMPHETAMINE SACCHARATE, AMPHETAMINE ASPARTATE, DEXTROAMPHETAMINE SULFATE AND AMPHETAMINE SULFATE 5; 5; 5; 5 MG/1; MG/1; MG/1; MG/1
TABLET ORAL
Qty: 75 TABLET | Refills: 0 | Status: SHIPPED | OUTPATIENT
Start: 2024-08-12

## 2024-08-16 ENCOUNTER — APPOINTMENT (OUTPATIENT)
Dept: PRIMARY CARE | Facility: CLINIC | Age: 39
End: 2024-08-16
Payer: COMMERCIAL

## 2024-08-19 ENCOUNTER — APPOINTMENT (OUTPATIENT)
Dept: PRIMARY CARE | Facility: CLINIC | Age: 39
End: 2024-08-19
Payer: COMMERCIAL

## 2024-08-19 VITALS
HEIGHT: 68 IN | HEART RATE: 119 BPM | WEIGHT: 167 LBS | BODY MASS INDEX: 25.31 KG/M2 | DIASTOLIC BLOOD PRESSURE: 82 MMHG | SYSTOLIC BLOOD PRESSURE: 140 MMHG

## 2024-08-19 DIAGNOSIS — M25.569 KNEE PAIN, UNSPECIFIED CHRONICITY, UNSPECIFIED LATERALITY: ICD-10-CM

## 2024-08-19 DIAGNOSIS — L30.9 ECZEMA, UNSPECIFIED TYPE: ICD-10-CM

## 2024-08-19 DIAGNOSIS — R05.9 COUGH, UNSPECIFIED TYPE: Primary | ICD-10-CM

## 2024-08-19 DIAGNOSIS — R21 RASH: ICD-10-CM

## 2024-08-19 PROCEDURE — 99214 OFFICE O/P EST MOD 30 MIN: CPT | Performed by: FAMILY MEDICINE

## 2024-08-19 PROCEDURE — 3008F BODY MASS INDEX DOCD: CPT | Performed by: FAMILY MEDICINE

## 2024-08-19 PROCEDURE — 3079F DIAST BP 80-89 MM HG: CPT | Performed by: FAMILY MEDICINE

## 2024-08-19 PROCEDURE — 3077F SYST BP >= 140 MM HG: CPT | Performed by: FAMILY MEDICINE

## 2024-08-19 RX ORDER — NAPROXEN 500 MG/1
500 TABLET ORAL DAILY
Qty: 90 TABLET | Refills: 1 | Status: SHIPPED | OUTPATIENT
Start: 2024-08-19 | End: 2025-08-19

## 2024-08-19 RX ORDER — TRIAMCINOLONE ACETONIDE 1 MG/G
CREAM TOPICAL 2 TIMES DAILY
Qty: 15 G | Refills: 0 | Status: SHIPPED | OUTPATIENT
Start: 2024-08-19

## 2024-08-19 RX ORDER — PREDNISONE 10 MG/1
TABLET ORAL
Qty: 30 TABLET | Refills: 0 | Status: SHIPPED | OUTPATIENT
Start: 2024-08-19

## 2024-08-19 ASSESSMENT — ENCOUNTER SYMPTOMS
CHILLS: 0
SHORTNESS OF BREATH: 0
HEADACHES: 0
FATIGUE: 0
DIZZINESS: 0
CHEST TIGHTNESS: 0

## 2024-08-19 ASSESSMENT — PATIENT HEALTH QUESTIONNAIRE - PHQ9
1. LITTLE INTEREST OR PLEASURE IN DOING THINGS: NOT AT ALL
2. FEELING DOWN, DEPRESSED OR HOPELESS: NOT AT ALL
SUM OF ALL RESPONSES TO PHQ9 QUESTIONS 1 AND 2: 0

## 2024-08-19 NOTE — PROGRESS NOTES
"Subjective   Patient ID: Navin Rios is a 39 y.o. male who presents for Sick Visit (Congested cough for weeks and covid neg 2x. Also has a rash on right arm).    Cough   - has been ongoing for approximately 3 weeks   - reports he has been having some hoarseness and tightness in his chest   - has had waxing and waning cough since   - denies any fevers/chills, denies any body aches   - cough is productive of yellowish-green mucous   - has been taking breathing treatments with some improvements in symptoms   - no history of asthma   - has been around sick contacts          Review of Systems   Constitutional:  Negative for chills and fatigue.   Respiratory:  Negative for chest tightness and shortness of breath.    Neurological:  Negative for dizziness and headaches.       Objective   /82   Pulse (!) 119   Ht 1.727 m (5' 8\")   Wt 75.8 kg (167 lb)   BMI 25.39 kg/m²     Physical Exam  Constitutional:       General: He is not in acute distress.     Appearance: Normal appearance.   HENT:      Mouth/Throat:      Mouth: Mucous membranes are moist.      Pharynx: Oropharynx is clear.   Cardiovascular:      Rate and Rhythm: Normal rate and regular rhythm.   Pulmonary:      Effort: No respiratory distress.      Breath sounds: No stridor. No wheezing.   Neurological:      Mental Status: He is alert.   Psychiatric:         Mood and Affect: Mood normal.         Behavior: Behavior normal.         Assessment/Plan   Problem List Items Addressed This Visit             ICD-10-CM    Knee pain M25.569     Stable  - refilled naproxen today   - f/u PRN          Relevant Medications    naproxen (Naprosyn) 500 mg tablet    Rash R21     Stable   - oral prednisone to get the rash under control   - topical steroid PRN to help manage symptoms   - f/u PRN          Relevant Medications    predniSONE (Deltasone) 10 mg tablet     Other Visit Diagnoses         Codes    Cough, unspecified type    -  Primary R05.9    stable   - treat with oral " prednisone   - check CXR to rule out pneumonia   - f/u PRN     Relevant Medications    predniSONE (Deltasone) 10 mg tablet    Other Relevant Orders    XR chest 2 views    Eczema, unspecified type     L30.9    Relevant Medications    triamcinolone (Kenalog) 0.1 % cream

## 2024-08-19 NOTE — ASSESSMENT & PLAN NOTE
Stable   - oral prednisone to get the rash under control   - topical steroid PRN to help manage symptoms   - f/u PRN

## 2024-08-26 ENCOUNTER — APPOINTMENT (OUTPATIENT)
Dept: PRIMARY CARE | Facility: CLINIC | Age: 39
End: 2024-08-26
Payer: COMMERCIAL

## 2024-09-06 ENCOUNTER — APPOINTMENT (OUTPATIENT)
Dept: PRIMARY CARE | Facility: CLINIC | Age: 39
End: 2024-09-06
Payer: COMMERCIAL

## 2024-09-06 VITALS
SYSTOLIC BLOOD PRESSURE: 100 MMHG | WEIGHT: 166 LBS | HEIGHT: 68 IN | TEMPERATURE: 97.7 F | BODY MASS INDEX: 25.16 KG/M2 | HEART RATE: 97 BPM | DIASTOLIC BLOOD PRESSURE: 62 MMHG

## 2024-09-06 DIAGNOSIS — I10 BENIGN ESSENTIAL HYPERTENSION: Primary | ICD-10-CM

## 2024-09-06 DIAGNOSIS — F41.1 GENERALIZED ANXIETY DISORDER: ICD-10-CM

## 2024-09-06 DIAGNOSIS — G47.01 INSOMNIA DUE TO MEDICAL CONDITION: ICD-10-CM

## 2024-09-06 PROBLEM — J01.00 ACUTE MAXILLARY SINUSITIS: Status: RESOLVED | Noted: 2024-02-22 | Resolved: 2024-09-06

## 2024-09-06 PROCEDURE — 3008F BODY MASS INDEX DOCD: CPT | Performed by: FAMILY MEDICINE

## 2024-09-06 PROCEDURE — 3074F SYST BP LT 130 MM HG: CPT | Performed by: FAMILY MEDICINE

## 2024-09-06 PROCEDURE — 3078F DIAST BP <80 MM HG: CPT | Performed by: FAMILY MEDICINE

## 2024-09-06 PROCEDURE — 99214 OFFICE O/P EST MOD 30 MIN: CPT | Performed by: FAMILY MEDICINE

## 2024-09-06 RX ORDER — METOPROLOL SUCCINATE 50 MG/1
50 TABLET, EXTENDED RELEASE ORAL 2 TIMES DAILY
Qty: 180 TABLET | Refills: 1 | Status: SHIPPED | OUTPATIENT
Start: 2024-09-06 | End: 2025-03-05

## 2024-09-06 RX ORDER — CEFUROXIME AXETIL 250 MG/1
TABLET ORAL EVERY 12 HOURS
COMMUNITY
Start: 2023-08-31 | End: 2024-09-06 | Stop reason: WASHOUT

## 2024-09-06 RX ORDER — TEMAZEPAM 15 MG/1
30 CAPSULE ORAL NIGHTLY PRN
Qty: 60 CAPSULE | Refills: 2 | Status: SHIPPED | OUTPATIENT
Start: 2024-09-06 | End: 2024-12-05

## 2024-09-06 NOTE — PROGRESS NOTES
"    /62   Pulse 97   Temp 36.5 °C (97.7 °F)   Ht 1.727 m (5' 8\")   Wt 75.3 kg (166 lb)   BMI 25.24 kg/m²     Past Medical History:   Diagnosis Date    ADHD (attention deficit hyperactivity disorder) 2001    Anxiety 2010    Hypertension 2010    Personal history of other mental and behavioral disorders     History of attention deficit hyperactivity disorder (ADHD)       Patient Active Problem List   Diagnosis    ADD (attention deficit disorder)    Attention deficit hyperactivity disorder (ADHD), predominantly inattentive type    Benign essential hypertension    Nicotine dependence    Closed fracture of acetabulum (Multi)    Closed fracture of medial condyle of tibia    Current every day smoker    Disorder of brain    Generalized anxiety disorder    Insomnia due to medical condition    Overweight with body mass index (BMI) of 25 to 25.9 in adult    Acute maxillary sinusitis    Gastritis    History of mental disorder    Knee pain    Skin neoplasm    Rash       Current Outpatient Medications   Medication Sig Dispense Refill    [START ON 10/11/2024] amphetamine-dextroamphetamine (Adderall) 20 mg tablet Take 1.5 tabs (30mg) by mouth in am and 1 tab (20mg) by mouth in afternoon. Do not fill before October 11, 2024. 75 tablet 0    [START ON 9/11/2024] amphetamine-dextroamphetamine (Adderall) 20 mg tablet Take 1.5 tabs (30mg) by mouth in am and 1 tab (20mg) by mouth in afternoon. Do not fill before September 11, 2024. 75 tablet 0    amphetamine-dextroamphetamine (Adderall) 20 mg tablet Take 1.5 tabs (30mg) by mouth in am and 1 tab (20mg) by mouth in afternoon. 75 tablet 0    melatonin 5 mg tablet Take 1 tablet (5 mg) by mouth once daily at bedtime.      metoprolol succinate XL (Toprol-XL) 50 mg 24 hr tablet Take 1 tablet (50 mg) by mouth 2 times a day. 180 tablet 1    naproxen (Naprosyn) 500 mg tablet Take 1 tablet (500 mg) by mouth once daily. 90 tablet 1    PARoxetine (Paxil) 30 mg tablet Take 2 tablets (60 mg) " by mouth once daily. AS DIRECTED. 180 tablet 1    temazepam (Restoril) 15 mg capsule Take 2 capsules (30 mg) by mouth as needed at bedtime for sleep. 60 capsule 2    triamcinolone (Kenalog) 0.1 % cream Apply topically 2 times a day. Apply to affected area 1-2 times daily as needed. 15 g 0    cefuroxime (Ceftin) 250 mg tablet Take by mouth every 12 hours.      predniSONE (Deltasone) 10 mg tablet Days 1 and 2 take 5 pills daily, days 3 and 4 take 4 pills daily, days 5 and 6 take 3 pills daily, days 7 and 8 take 2 pills daily, days 9 and 10 take 1 pill daily (Patient not taking: Reported on 9/6/2024) 30 tablet 0     No current facility-administered medications for this visit.       CC/HPI/ASSESSMENT/PLAN    CC follow medication    HPI patient with a history of hypertension insomnia generalized anxiety disorder ADHD.  Patient sees neurology for Adderall medication.  Patient takes temazepam at bedtime for his insomnia which is related to the ADHD medication.  He remains on Paxil for his anxiety.  Blood pressure under much better control since we increase metoprolol.  He monitors at home notes blood pressures been doing well.  Patient remains smoke-free.  Denies fever chills chest pain.  ROS negative septa noted above.  Past medical social surgical history is reviewed    Exam: Vital stable eyes no jaundice neck supple lungs CTA CV RRR psych calm pleasant male no psychosis    A/P 1.  Hypertension stable meds refilled 2 insomnia stable temazepam refilled.  OARRS report reviewed.  Report scanned in chart.  Have considered risk of abuse dependence addiction diversion.  No present concerns.  A new signed controlled substance agreement form is completed in the chart.  Urine drug screening is reviewed in the chart.  #3 generalized anxiety disorder chronic stable continue Paxil 60 mg daily.  Follow-up 3 months    There are no diagnoses linked to this encounter.

## 2024-10-11 DIAGNOSIS — L30.9 ECZEMA, UNSPECIFIED TYPE: ICD-10-CM

## 2024-10-11 RX ORDER — TRIAMCINOLONE ACETONIDE 1 MG/G
CREAM TOPICAL 2 TIMES DAILY
Qty: 15 G | Refills: 0 | Status: SHIPPED | OUTPATIENT
Start: 2024-10-11

## 2024-11-04 ENCOUNTER — APPOINTMENT (OUTPATIENT)
Dept: NEUROLOGY | Facility: CLINIC | Age: 39
End: 2024-11-04
Payer: COMMERCIAL

## 2024-11-21 ENCOUNTER — OFFICE VISIT (OUTPATIENT)
Dept: NEUROLOGY | Facility: CLINIC | Age: 39
End: 2024-11-21
Payer: COMMERCIAL

## 2024-11-21 VITALS
BODY MASS INDEX: 24.37 KG/M2 | HEART RATE: 121 BPM | SYSTOLIC BLOOD PRESSURE: 130 MMHG | DIASTOLIC BLOOD PRESSURE: 80 MMHG | WEIGHT: 160.8 LBS | HEIGHT: 68 IN

## 2024-11-21 DIAGNOSIS — G47.01 INSOMNIA DUE TO MEDICAL CONDITION: ICD-10-CM

## 2024-11-21 DIAGNOSIS — F90.9 ATTENTION DEFICIT HYPERACTIVITY DISORDER (ADHD), UNSPECIFIED ADHD TYPE: ICD-10-CM

## 2024-11-21 DIAGNOSIS — F41.1 GENERALIZED ANXIETY DISORDER: ICD-10-CM

## 2024-11-21 DIAGNOSIS — G93.9 DISORDER OF BRAIN: Primary | ICD-10-CM

## 2024-11-21 PROBLEM — Z86.59 HISTORY OF MENTAL DISORDER: Status: RESOLVED | Noted: 2024-02-22 | Resolved: 2024-11-21

## 2024-11-21 PROCEDURE — 3008F BODY MASS INDEX DOCD: CPT | Performed by: PSYCHIATRY & NEUROLOGY

## 2024-11-21 PROCEDURE — 3075F SYST BP GE 130 - 139MM HG: CPT | Performed by: PSYCHIATRY & NEUROLOGY

## 2024-11-21 PROCEDURE — 99214 OFFICE O/P EST MOD 30 MIN: CPT | Performed by: PSYCHIATRY & NEUROLOGY

## 2024-11-21 PROCEDURE — 3079F DIAST BP 80-89 MM HG: CPT | Performed by: PSYCHIATRY & NEUROLOGY

## 2024-11-21 RX ORDER — DEXTROAMPHETAMINE SACCHARATE, AMPHETAMINE ASPARTATE, DEXTROAMPHETAMINE SULFATE AND AMPHETAMINE SULFATE 5; 5; 5; 5 MG/1; MG/1; MG/1; MG/1
TABLET ORAL
Qty: 75 TABLET | Refills: 0 | Status: SHIPPED | OUTPATIENT
Start: 2025-01-19

## 2024-11-21 RX ORDER — DEXTROAMPHETAMINE SACCHARATE, AMPHETAMINE ASPARTATE, DEXTROAMPHETAMINE SULFATE AND AMPHETAMINE SULFATE 5; 5; 5; 5 MG/1; MG/1; MG/1; MG/1
TABLET ORAL
Qty: 75 TABLET | Refills: 0 | Status: SHIPPED | OUTPATIENT
Start: 2024-12-20

## 2024-11-21 RX ORDER — DEXTROAMPHETAMINE SACCHARATE, AMPHETAMINE ASPARTATE, DEXTROAMPHETAMINE SULFATE AND AMPHETAMINE SULFATE 5; 5; 5; 5 MG/1; MG/1; MG/1; MG/1
TABLET ORAL
Qty: 75 TABLET | Refills: 0 | Status: SHIPPED | OUTPATIENT
Start: 2024-11-21

## 2024-11-21 ASSESSMENT — ENCOUNTER SYMPTOMS
ARTHRALGIAS: 0
SHORTNESS OF BREATH: 0
FREQUENCY: 0
TREMORS: 0
UNEXPECTED WEIGHT CHANGE: 0
SLEEP DISTURBANCE: 1
ABDOMINAL PAIN: 0
FATIGUE: 0
WEAKNESS: 0
HALLUCINATIONS: 0
PALPITATIONS: 0
COUGH: 0
HYPERACTIVE: 0
BACK PAIN: 0
NECK PAIN: 0
FEVER: 0
DECREASED CONCENTRATION: 1
CONFUSION: 0
FACIAL ASYMMETRY: 0
SINUS PRESSURE: 0
JOINT SWELLING: 0
NUMBNESS: 0
HEADACHES: 0
SEIZURES: 0
VOMITING: 0
WHEEZING: 0
PHOTOPHOBIA: 0
NERVOUS/ANXIOUS: 1
TROUBLE SWALLOWING: 0
SPEECH DIFFICULTY: 0
NAUSEA: 0
NECK STIFFNESS: 0
ADENOPATHY: 0
AGITATION: 0
DIZZINESS: 0
LIGHT-HEADEDNESS: 0
EYE PAIN: 0
BRUISES/BLEEDS EASILY: 0
DIFFICULTY URINATING: 0

## 2024-11-21 ASSESSMENT — PATIENT HEALTH QUESTIONNAIRE - PHQ9
SUM OF ALL RESPONSES TO PHQ9 QUESTIONS 1 AND 2: 0
1. LITTLE INTEREST OR PLEASURE IN DOING THINGS: NOT AT ALL
2. FEELING DOWN, DEPRESSED OR HOPELESS: NOT AT ALL

## 2024-11-21 NOTE — PROGRESS NOTES
Navin Rios  39 y.o.       SUBJECTIVE    HPI   Navin is a 39-year-old young man who was seen today for follow-up of his encephalopathy due to attention deficit disorder with difficulty at work and home.  Since last seen he has been doing very well on Adderall 30 in the morning and takes the 20 mg short acting in the afternoon.  He has tried few other medication without much success and he can tell a big difference when he is on medicine compared off medicine.  He does take day off during weekends along holidays.  Today his physical and neurological examination is normal.  I would like to continue his medicine the way he is taking and have discussed the controlled substance policy, abuse potential, risk benefit and the precautions to be taken and depending on how he does I might make future recommendation when he comes back to see me in 3 months.    I did review the medication list.      Due to technical limitations of voice recognition and human error, this note may not accurately reflect the care of the patient.    Review of Systems   Constitutional:  Negative for fatigue, fever and unexpected weight change.   HENT:  Negative for dental problem, ear pain, hearing loss, sinus pressure, tinnitus and trouble swallowing.    Eyes:  Negative for photophobia, pain and visual disturbance.   Respiratory:  Negative for cough, shortness of breath and wheezing.    Cardiovascular:  Negative for chest pain, palpitations and leg swelling.   Gastrointestinal:  Negative for abdominal pain, nausea and vomiting.   Genitourinary:  Negative for difficulty urinating, enuresis and frequency.   Musculoskeletal:  Negative for arthralgias, back pain, joint swelling, neck pain and neck stiffness.   Skin:  Negative for pallor and rash.   Allergic/Immunologic: Negative for food allergies.   Neurological:  Negative for dizziness, tremors, seizures, syncope, facial asymmetry, speech difficulty, weakness, light-headedness, numbness and  "headaches.   Hematological:  Negative for adenopathy. Does not bruise/bleed easily.   Psychiatric/Behavioral:  Positive for decreased concentration and sleep disturbance. Negative for agitation, behavioral problems, confusion and hallucinations. The patient is nervous/anxious. The patient is not hyperactive.         Patient Active Problem List   Diagnosis    ADD (attention deficit disorder)    Attention deficit hyperactivity disorder (ADHD), predominantly inattentive type    Benign essential hypertension    Nicotine dependence    Closed fracture of acetabulum (Multi)    Closed fracture of medial condyle of tibia    Current every day smoker    Disorder of brain    Generalized anxiety disorder    Insomnia due to medical condition    Overweight with body mass index (BMI) of 25 to 25.9 in adult    Gastritis    Knee pain    Skin neoplasm    Rash     Past Medical History:   Diagnosis Date    ADHD (attention deficit hyperactivity disorder) 2001    Anxiety 2010    Hypertension 2010    Personal history of other mental and behavioral disorders     History of attention deficit hyperactivity disorder (ADHD)     Past Surgical History:   Procedure Laterality Date    FRACTURE SURGERY  2018    OTHER SURGICAL HISTORY  08/09/2016    Oral Surgery       reports that he quit smoking about 9 months ago. His smoking use included cigarettes. He started smoking about 18 years ago. He has a 13.6 pack-year smoking history. He has never used smokeless tobacco. He reports that he does not currently use alcohol after a past usage of about 2.0 standard drinks of alcohol per week. He reports that he does not use drugs.    /80 (BP Location: Left arm, Patient Position: Sitting, BP Cuff Size: Adult)   Pulse (!) 121   Ht 1.727 m (5' 8\")   Wt 72.9 kg (160 lb 12.8 oz)   BMI 24.45 kg/m²     OBJECTIVE  Physical Exam/Neurological Exam   Constitutional: General appearance: no acute distress   Auscultation of Heart: Regular rate and rhythm, no " murmurs, normal S1 and S2.   Carotid Arteries: Intact without any bruits.   Neck is supple.   No lymph adenopathy.   Peripheral Vascular Exam: Pulses +2 and equal in all extremities. No swelling, varicosities, edema or tenderness to palpations.    Abdomen is soft, nondistended. No organomegaly.  Mental status: The patient was in no distress, alert, interactive and cooperative. Affect is appropriate.   Orientation: oriented to person, oriented to place and oriented to time.   Memory: recent memory intact and remote memory intact.   Attention: normal attention span and normal concentrating ability.   Language: normal comprehension and no difficulty naming common objects.   Fund of knowledge: Patient displays adequate knowledge of current events, adequate fund of knowledge regarding past history and adequate fund of knowledge regarding vocabulary.   Eyes: The ophthalmoscopic examination was normal. The fundi are visualized with normal disc margins and without.  Cranial nerve II: Visual fields full to confrontation.   Cranial nerves III, IV, and VI: Pupils round, equally reactive to light; no ptosis. EOMs intact. No nystagmus.   Cranial Nerve V: Facial sensation intact bilaterally.   Cranial nerve VII: Normal and symmetric facial strength.   Cranial nerve VIII: Hearing is intact bilaterally to finger rub / whisper.   Cranial nerves IX and X: Palate elevates symmetrically.   Cranial nerve XI: Shoulder shrug and neck rotation strength are intact.   Cranial nerve XII: Tongue midline with normal strength.   Motor: Motor exam was normal. Muscle bulk was normal in both upper and lower extremities. Muscle tone was normal in both upper and lower extremities. Muscle strength was 5/5 throughout. no abnormal or adventitious movements were present.   Deep Tendon Reflexes: left biceps 2+ , right biceps 2+, left triceps 2+, right triceps 2+, left brachioradialis 2+, right brachioradialis 2+, left patella 2+, right patella 2+, left  ankle jerk 2+, right ankle jerk 2+   Plantar Reflex: Toes downgoing to plantar stimulation on the left. Toes downgoing to plantar stimulation on the right.   Sensory Exam: Normal to light touch.   Coordination: There is no limb dystaxia and rapid alternating movements are intact.  Gait: Gait is normal without spasticity, ataxia or bradykinesia. Stance is stable with a negative Romberg.      ASSESSMENT/PLAN  Diagnoses and all orders for this visit:  Disorder of brain  Attention deficit hyperactivity disorder (ADHD), unspecified ADHD type  -     amphetamine-dextroamphetamine (Adderall) 20 mg tablet; Take 1.5 tabs (30mg) by mouth in am and 1 tab (20mg) by mouth in afternoon.  -     amphetamine-dextroamphetamine (Adderall) 20 mg tablet; Take 1.5 tabs (30mg) by mouth in am and 1 tab (20mg) by mouth in afternoon. Do not fill before December 20, 2024.  -     amphetamine-dextroamphetamine (Adderall) 20 mg tablet; Take 1.5 tabs (30mg) by mouth in am and 1 tab (20mg) by mouth in afternoon. Do not fill before January 19, 2025.  Insomnia due to medical condition  Generalized anxiety disorder        Jacob Del Rosario MD  11/21/2024  11:14 AM   16-Jul-2019 03:28

## 2024-12-06 ENCOUNTER — APPOINTMENT (OUTPATIENT)
Dept: PRIMARY CARE | Facility: CLINIC | Age: 39
End: 2024-12-06
Payer: COMMERCIAL

## 2025-01-28 DIAGNOSIS — L30.9 ECZEMA, UNSPECIFIED TYPE: ICD-10-CM

## 2025-01-29 RX ORDER — TRIAMCINOLONE ACETONIDE 1 MG/G
CREAM TOPICAL 2 TIMES DAILY
Qty: 15 G | Refills: 0 | Status: SHIPPED | OUTPATIENT
Start: 2025-01-29

## 2025-02-12 ENCOUNTER — APPOINTMENT (OUTPATIENT)
Dept: NEUROLOGY | Facility: CLINIC | Age: 40
End: 2025-02-12
Payer: COMMERCIAL

## 2025-02-17 DIAGNOSIS — F41.1 GENERALIZED ANXIETY DISORDER: ICD-10-CM

## 2025-02-19 RX ORDER — PAROXETINE 30 MG/1
60 TABLET, FILM COATED ORAL DAILY
Qty: 180 TABLET | Refills: 1 | OUTPATIENT
Start: 2025-02-19 | End: 2026-02-19

## 2025-02-26 ENCOUNTER — OFFICE VISIT (OUTPATIENT)
Dept: NEUROLOGY | Facility: CLINIC | Age: 40
End: 2025-02-26
Payer: COMMERCIAL

## 2025-02-26 VITALS
SYSTOLIC BLOOD PRESSURE: 118 MMHG | HEIGHT: 68 IN | BODY MASS INDEX: 25.88 KG/M2 | WEIGHT: 170.8 LBS | HEART RATE: 119 BPM | DIASTOLIC BLOOD PRESSURE: 94 MMHG

## 2025-02-26 DIAGNOSIS — G93.9 DISORDER OF BRAIN: Primary | ICD-10-CM

## 2025-02-26 DIAGNOSIS — F90.9 ATTENTION DEFICIT HYPERACTIVITY DISORDER (ADHD), UNSPECIFIED ADHD TYPE: ICD-10-CM

## 2025-02-26 DIAGNOSIS — F41.1 GENERALIZED ANXIETY DISORDER: ICD-10-CM

## 2025-02-26 PROCEDURE — 3008F BODY MASS INDEX DOCD: CPT | Performed by: PSYCHIATRY & NEUROLOGY

## 2025-02-26 PROCEDURE — 3074F SYST BP LT 130 MM HG: CPT | Performed by: PSYCHIATRY & NEUROLOGY

## 2025-02-26 PROCEDURE — 1036F TOBACCO NON-USER: CPT | Performed by: PSYCHIATRY & NEUROLOGY

## 2025-02-26 PROCEDURE — 3080F DIAST BP >= 90 MM HG: CPT | Performed by: PSYCHIATRY & NEUROLOGY

## 2025-02-26 PROCEDURE — 99214 OFFICE O/P EST MOD 30 MIN: CPT | Performed by: PSYCHIATRY & NEUROLOGY

## 2025-02-26 RX ORDER — DEXTROAMPHETAMINE SACCHARATE, AMPHETAMINE ASPARTATE, DEXTROAMPHETAMINE SULFATE AND AMPHETAMINE SULFATE 5; 5; 5; 5 MG/1; MG/1; MG/1; MG/1
TABLET ORAL
Qty: 75 TABLET | Refills: 0 | Status: SHIPPED | OUTPATIENT
Start: 2025-03-25

## 2025-02-26 RX ORDER — DEXTROAMPHETAMINE SACCHARATE, AMPHETAMINE ASPARTATE, DEXTROAMPHETAMINE SULFATE AND AMPHETAMINE SULFATE 5; 5; 5; 5 MG/1; MG/1; MG/1; MG/1
TABLET ORAL
Qty: 75 TABLET | Refills: 0 | Status: SHIPPED | OUTPATIENT
Start: 2025-04-25

## 2025-02-26 RX ORDER — DEXTROAMPHETAMINE SACCHARATE, AMPHETAMINE ASPARTATE, DEXTROAMPHETAMINE SULFATE AND AMPHETAMINE SULFATE 5; 5; 5; 5 MG/1; MG/1; MG/1; MG/1
TABLET ORAL
Qty: 75 TABLET | Refills: 0 | Status: SHIPPED | OUTPATIENT
Start: 2025-02-26 | End: 2025-02-27 | Stop reason: SDUPTHER

## 2025-02-26 ASSESSMENT — ENCOUNTER SYMPTOMS
ARTHRALGIAS: 0
SLEEP DISTURBANCE: 1
DIFFICULTY URINATING: 0
HYPERACTIVE: 0
TREMORS: 0
AGITATION: 0
NECK STIFFNESS: 0
SEIZURES: 0
HEADACHES: 0
FACIAL ASYMMETRY: 0
DYSPHORIC MOOD: 1
VOMITING: 0
NUMBNESS: 0
DIZZINESS: 0
SPEECH DIFFICULTY: 0
BACK PAIN: 0
LIGHT-HEADEDNESS: 0
ADENOPATHY: 0
BRUISES/BLEEDS EASILY: 0
WEAKNESS: 0
DECREASED CONCENTRATION: 1
CONFUSION: 0
UNEXPECTED WEIGHT CHANGE: 0
JOINT SWELLING: 0
WHEEZING: 0
SHORTNESS OF BREATH: 0
FEVER: 0
NAUSEA: 0
FATIGUE: 0
PHOTOPHOBIA: 0
SINUS PRESSURE: 0
TROUBLE SWALLOWING: 0
FREQUENCY: 0
HALLUCINATIONS: 0
EYE PAIN: 0
COUGH: 0
ABDOMINAL PAIN: 0
PALPITATIONS: 0
NERVOUS/ANXIOUS: 1
NECK PAIN: 0

## 2025-02-26 ASSESSMENT — PATIENT HEALTH QUESTIONNAIRE - PHQ9
SUM OF ALL RESPONSES TO PHQ9 QUESTIONS 1 & 2: 0
1. LITTLE INTEREST OR PLEASURE IN DOING THINGS: NOT AT ALL
2. FEELING DOWN, DEPRESSED OR HOPELESS: NOT AT ALL

## 2025-02-26 NOTE — PROGRESS NOTES
Navin Rios  39 y.o.       SUBJECTIVE  Navin is a 39-year-old young man who was seen today for follow-up of his encephalopathy due to attention deficit with generalized anxiety disorder.  Since last seen he is doing well on Adderall and can tell a big difference when he is on medicine compared off medicine.  No side effects of the medication with today his physical and neurological examination was normal.  I would like to continue the medication the way he is taking and depending on how he does I might make future recommendation when he comes back to see me in 3 months.    I did review the medication list.      Due to technical limitations of voice recognition and human error, this note may not accurately reflect the care of the patient.    Review of Systems   Constitutional:  Negative for fatigue, fever and unexpected weight change.   HENT:  Negative for dental problem, ear pain, hearing loss, sinus pressure, tinnitus and trouble swallowing.    Eyes:  Negative for photophobia, pain and visual disturbance.   Respiratory:  Negative for cough, shortness of breath and wheezing.    Cardiovascular:  Negative for chest pain, palpitations and leg swelling.   Gastrointestinal:  Negative for abdominal pain, nausea and vomiting.   Genitourinary:  Negative for difficulty urinating, enuresis and frequency.   Musculoskeletal:  Negative for arthralgias, back pain, joint swelling, neck pain and neck stiffness.   Skin:  Negative for pallor and rash.   Allergic/Immunologic: Negative for food allergies.   Neurological:  Negative for dizziness, tremors, seizures, syncope, facial asymmetry, speech difficulty, weakness, light-headedness, numbness and headaches.   Hematological:  Negative for adenopathy. Does not bruise/bleed easily.   Psychiatric/Behavioral:  Positive for decreased concentration, dysphoric mood and sleep disturbance. Negative for agitation, behavioral problems, confusion and hallucinations. The patient is  "nervous/anxious. The patient is not hyperactive.         Patient Active Problem List   Diagnosis    ADD (attention deficit disorder)    Attention deficit hyperactivity disorder (ADHD), predominantly inattentive type    Benign essential hypertension    Nicotine dependence    Closed fracture of acetabulum (Multi)    Closed fracture of medial condyle of tibia    Current every day smoker    Disorder of brain    Generalized anxiety disorder    Insomnia due to medical condition    Overweight with body mass index (BMI) of 25 to 25.9 in adult    Gastritis    Knee pain    Skin neoplasm    Rash     Past Medical History:   Diagnosis Date    ADHD (attention deficit hyperactivity disorder) 2001    Anxiety 2010    Hypertension 2010    Personal history of other mental and behavioral disorders     History of attention deficit hyperactivity disorder (ADHD)     Past Surgical History:   Procedure Laterality Date    FRACTURE SURGERY  2018    OTHER SURGICAL HISTORY  08/09/2016    Oral Surgery       reports that he quit smoking about 12 months ago. His smoking use included cigarettes. He started smoking about 19 years ago. He has a 13.6 pack-year smoking history. He has never used smokeless tobacco. He reports that he does not currently use alcohol after a past usage of about 2.0 standard drinks of alcohol per week. He reports that he does not use drugs.    BP (!) 118/94 (BP Location: Left arm, Patient Position: Sitting, BP Cuff Size: Adult)   Pulse (!) 119   Ht 1.727 m (5' 8\")   Wt 77.5 kg (170 lb 12.8 oz)   BMI 25.97 kg/m²     OBJECTIVE  Physical Exam/Neurological Exam   Constitutional: General appearance: no acute distress   Auscultation of Heart: Regular rate and rhythm, no murmurs, normal S1 and S2.   Carotid Arteries: Intact without any bruits.   Neck is supple.   No lymph adenopathy.   Peripheral Vascular Exam: Pulses +2 and equal in all extremities. No swelling, varicosities, edema or tenderness to palpations.    Abdomen is " soft, nondistended. No organomegaly.  Mental status: The patient was in no distress, alert, interactive and cooperative. Affect is appropriate.   Orientation: oriented to person, oriented to place and oriented to time.   Memory: recent memory intact and remote memory intact.   Attention: normal attention span and normal concentrating ability.   Language: normal comprehension and no difficulty naming common objects.   Fund of knowledge: Patient displays adequate knowledge of current events, adequate fund of knowledge regarding past history and adequate fund of knowledge regarding vocabulary.   Eyes: The ophthalmoscopic examination was normal. The fundi are visualized with normal disc margins and without.  Cranial nerve II: Visual fields full to confrontation.   Cranial nerves III, IV, and VI: Pupils round, equally reactive to light; no ptosis. EOMs intact. No nystagmus.   Cranial Nerve V: Facial sensation intact bilaterally.   Cranial nerve VII: Normal and symmetric facial strength.   Cranial nerve VIII: Hearing is intact bilaterally to finger rub / whisper.   Cranial nerves IX and X: Palate elevates symmetrically.   Cranial nerve XI: Shoulder shrug and neck rotation strength are intact.   Cranial nerve XII: Tongue midline with normal strength.   Motor: Motor exam was normal. Muscle bulk was normal in both upper and lower extremities. Muscle tone was normal in both upper and lower extremities. Muscle strength was 5/5 throughout. no abnormal or adventitious movements were present.   Deep Tendon Reflexes: left biceps 2+ , right biceps 2+, left triceps 2+, right triceps 2+, left brachioradialis 2+, right brachioradialis 2+, left patella 2+, right patella 2+, left ankle jerk 2+, right ankle jerk 2+   Plantar Reflex: Toes downgoing to plantar stimulation on the left. Toes downgoing to plantar stimulation on the right.   Sensory Exam: Normal to light touch.   Coordination: There is no limb dystaxia and rapid alternating  movements are intact.  Gait: Gait is normal without spasticity, ataxia or bradykinesia. Stance is stable with a negative Romberg.      ASSESSMENT/PLAN  Diagnoses and all orders for this visit:  Disorder of brain  Attention deficit hyperactivity disorder (ADHD), unspecified ADHD type  -     Drug Screen, Urine With Reflex to Confirmation  -     amphetamine-dextroamphetamine (Adderall) 20 mg tablet; Take 1.5 tabs (30mg) by mouth in am and 1 tab (20mg) by mouth in afternoon.  -     amphetamine-dextroamphetamine (Adderall) 20 mg tablet; Take 1.5 tabs (30mg) by mouth in am and 1 tab (20mg) by mouth in afternoon. Do not fill before March 25, 2025.  -     amphetamine-dextroamphetamine (Adderall) 20 mg tablet; Take 1.5 tabs (30mg) by mouth in am and 1 tab (20mg) by mouth in afternoon. Do not fill before April 25, 2025.  Generalized anxiety disorder        Jacob Del Rosario MD  2/26/2025  3:17 PM

## 2025-02-27 ENCOUNTER — TELEPHONE (OUTPATIENT)
Dept: NEUROLOGY | Facility: CLINIC | Age: 40
End: 2025-02-27
Payer: COMMERCIAL

## 2025-02-27 DIAGNOSIS — F90.9 ATTENTION DEFICIT HYPERACTIVITY DISORDER (ADHD), UNSPECIFIED ADHD TYPE: ICD-10-CM

## 2025-02-27 LAB
AMPHETAMINES UR QL: NORMAL NG/ML
BARBITURATES UR QL: NORMAL NG/ML
BENZODIAZ UR QL: NORMAL NG/ML
BZE UR QL: NORMAL NG/ML
CREAT UR-MCNC: NORMAL MG/DL
METHADONE UR QL: NORMAL NG/ML
OPIATES UR QL: NORMAL NG/ML
OXYCODONE UR QL: NORMAL NG/ML
PCP UR QL: NORMAL NG/ML
QUEST NOTES AND COMMENTS: NORMAL
THC UR QL: NORMAL NG/ML

## 2025-02-27 RX ORDER — DEXTROAMPHETAMINE SACCHARATE, AMPHETAMINE ASPARTATE, DEXTROAMPHETAMINE SULFATE AND AMPHETAMINE SULFATE 5; 5; 5; 5 MG/1; MG/1; MG/1; MG/1
TABLET ORAL
Qty: 75 TABLET | Refills: 0 | Status: SHIPPED | OUTPATIENT
Start: 2025-02-27

## 2025-02-27 NOTE — TELEPHONE ENCOUNTER
Pt called office stating his pharmacy does not have his Adderall 20 mg in stock and request new script be sent to [facility]. Pharmacy was updated and follow up is scheduled for 5/19/25.   Pt aware RX sent.

## 2025-03-03 ENCOUNTER — TELEPHONE (OUTPATIENT)
Dept: NEUROLOGY | Facility: CLINIC | Age: 40
End: 2025-03-03
Payer: COMMERCIAL

## 2025-03-03 DIAGNOSIS — F90.9 ATTENTION DEFICIT HYPERACTIVITY DISORDER (ADHD), UNSPECIFIED ADHD TYPE: ICD-10-CM

## 2025-03-03 NOTE — TELEPHONE ENCOUNTER
Called patient and LVM that on UDS quantity was not sufficient and he will need to get that lab redone.

## 2025-03-13 ENCOUNTER — APPOINTMENT (OUTPATIENT)
Dept: NEUROLOGY | Facility: CLINIC | Age: 40
End: 2025-03-13
Payer: COMMERCIAL

## 2025-05-19 ENCOUNTER — APPOINTMENT (OUTPATIENT)
Dept: NEUROLOGY | Facility: CLINIC | Age: 40
End: 2025-05-19
Payer: COMMERCIAL

## 2025-05-23 ENCOUNTER — APPOINTMENT (OUTPATIENT)
Dept: PRIMARY CARE | Facility: CLINIC | Age: 40
End: 2025-05-23
Payer: COMMERCIAL

## 2025-05-23 VITALS
TEMPERATURE: 97.6 F | BODY MASS INDEX: 25.16 KG/M2 | HEIGHT: 68 IN | DIASTOLIC BLOOD PRESSURE: 70 MMHG | HEART RATE: 141 BPM | WEIGHT: 166 LBS | SYSTOLIC BLOOD PRESSURE: 130 MMHG

## 2025-05-23 DIAGNOSIS — G47.01 INSOMNIA DUE TO MEDICAL CONDITION: ICD-10-CM

## 2025-05-23 DIAGNOSIS — E55.9 VITAMIN D DEFICIENCY: ICD-10-CM

## 2025-05-23 DIAGNOSIS — I10 BENIGN ESSENTIAL HYPERTENSION: ICD-10-CM

## 2025-05-23 DIAGNOSIS — R73.9 HYPERGLYCEMIA: ICD-10-CM

## 2025-05-23 DIAGNOSIS — M25.569 KNEE PAIN, UNSPECIFIED CHRONICITY, UNSPECIFIED LATERALITY: ICD-10-CM

## 2025-05-23 DIAGNOSIS — F41.1 GENERALIZED ANXIETY DISORDER: Primary | ICD-10-CM

## 2025-05-23 PROCEDURE — 1036F TOBACCO NON-USER: CPT | Performed by: FAMILY MEDICINE

## 2025-05-23 PROCEDURE — 3078F DIAST BP <80 MM HG: CPT | Performed by: FAMILY MEDICINE

## 2025-05-23 PROCEDURE — 3008F BODY MASS INDEX DOCD: CPT | Performed by: FAMILY MEDICINE

## 2025-05-23 PROCEDURE — 99214 OFFICE O/P EST MOD 30 MIN: CPT | Performed by: FAMILY MEDICINE

## 2025-05-23 PROCEDURE — 3075F SYST BP GE 130 - 139MM HG: CPT | Performed by: FAMILY MEDICINE

## 2025-05-23 RX ORDER — PAROXETINE 30 MG/1
60 TABLET, FILM COATED ORAL DAILY
Qty: 180 TABLET | Refills: 1 | Status: SHIPPED | OUTPATIENT
Start: 2025-05-23 | End: 2026-05-23

## 2025-05-23 RX ORDER — TEMAZEPAM 15 MG/1
30 CAPSULE ORAL NIGHTLY PRN
Qty: 60 CAPSULE | Refills: 2 | Status: SHIPPED | OUTPATIENT
Start: 2025-05-23 | End: 2025-08-21

## 2025-05-23 RX ORDER — METOPROLOL SUCCINATE 50 MG/1
50 TABLET, EXTENDED RELEASE ORAL 2 TIMES DAILY
Qty: 180 TABLET | Refills: 1 | Status: SHIPPED | OUTPATIENT
Start: 2025-05-23 | End: 2025-11-19

## 2025-05-23 RX ORDER — CELECOXIB 200 MG/1
200 CAPSULE ORAL DAILY
Qty: 90 CAPSULE | Refills: 1 | Status: SHIPPED | OUTPATIENT
Start: 2025-05-23 | End: 2025-11-19

## 2025-05-23 NOTE — PROGRESS NOTES
"    /70   Pulse (!) 141   Temp 36.4 °C (97.6 °F)   Ht 1.727 m (5' 8\")   Wt 75.3 kg (166 lb)   BMI 25.24 kg/m²     Medical History[1]    Problem List[2]    Current Medications[3]    CC/HPI/ASSESSMENT/PLAN    CC follow-up medication    HPI patient with a history of generalized anxiety disorder, hypertension, insomnia, joint pains especially knees.  He denies fever chills chest pain short of breath.  We discussed increasing exercise.  Patient will need blood work orders.  Patient using temazepam for his insomnia with good results.  OARRS report reviewed.  Report scanned in the chart.  Have considered risk of abuse dependence addiction diversion.  No present concerns.  Patient is scheduled to have his urine drug screening testing done soon.  I will order blood work to have it done at the same time.  Blood pressure is under good control.  Requesting refills for medication.  Currently using Naprosyn as needed for his joint pain we discussed trying Celebrex.  ROS negative except noted above.  Past medical social history reviewed    Exam calm eyes no jaundice neck supple lungs CTA CV RRR Ext no edema skin no rash psych calm pleasant male no psychosis    A/P 1.  Generalized anxiety disorder chronic stable meds refilled.  2 hypertension chronic stable meds refilled 3 insomnia chronic stable.  OARRS report reviewed.  Temazepam refilled.  #4 knee pain chronic.  Celebrex as ordered.  Blood work is ordered.  Medicines are refilled.  Follow-up 3 months    There are no diagnoses linked to this encounter.          [1]   Past Medical History:  Diagnosis Date    ADHD (attention deficit hyperactivity disorder) 2001    Anxiety 2010    Hypertension 2010    Personal history of other mental and behavioral disorders     History of attention deficit hyperactivity disorder (ADHD)   [2]   Patient Active Problem List  Diagnosis    ADD (attention deficit disorder)    Attention deficit hyperactivity disorder (ADHD), predominantly " inattentive type    Benign essential hypertension    Nicotine dependence    Closed fracture of acetabulum (Multi)    Closed fracture of medial condyle of tibia    Current every day smoker    Disorder of brain    Generalized anxiety disorder    Insomnia due to medical condition    Overweight with body mass index (BMI) of 25 to 25.9 in adult    Gastritis    Knee pain    Skin neoplasm    Rash   [3]   Current Outpatient Medications   Medication Sig Dispense Refill    amphetamine-dextroamphetamine (Adderall) 20 mg tablet Take 1.5 tabs (30mg) by mouth in am and 1 tab (20mg) by mouth in afternoon. Do not fill before March 25, 2025. 75 tablet 0    amphetamine-dextroamphetamine (Adderall) 20 mg tablet Take 1.5 tabs (30mg) by mouth in am and 1 tab (20mg) by mouth in afternoon. Do not fill before April 25, 2025. 75 tablet 0    amphetamine-dextroamphetamine (Adderall) 20 mg tablet Take 1.5 tabs (30mg) by mouth in am and 1 tab (20mg) by mouth in afternoon. 75 tablet 0    melatonin 5 mg tablet Take 1 tablet (5 mg) by mouth once daily at bedtime.      naproxen (Naprosyn) 500 mg tablet TAKE 1 TABLET BY MOUTH EVERY DAY 90 tablet 0    triamcinolone (Kenalog) 0.1 % cream APPLY TOPICALLY 2 TIMES A DAY. APPLY TO AFFECTED AREA 1-2 TIMES DAILY AS NEEDED. 15 g 0    metoprolol succinate XL (Toprol-XL) 50 mg 24 hr tablet Take 1 tablet (50 mg) by mouth 2 times a day. 180 tablet 1    PARoxetine (Paxil) 30 mg tablet Take 2 tablets (60 mg) by mouth once daily. AS DIRECTED. 180 tablet 1    temazepam (Restoril) 15 mg capsule Take 2 capsules (30 mg) by mouth as needed at bedtime for sleep. 60 capsule 2     No current facility-administered medications for this visit.

## 2025-06-16 ENCOUNTER — APPOINTMENT (OUTPATIENT)
Dept: NEUROLOGY | Facility: CLINIC | Age: 40
End: 2025-06-16
Payer: COMMERCIAL

## 2025-06-17 ENCOUNTER — APPOINTMENT (OUTPATIENT)
Dept: NEUROLOGY | Facility: CLINIC | Age: 40
End: 2025-06-17
Payer: COMMERCIAL

## 2025-06-17 VITALS
WEIGHT: 165 LBS | SYSTOLIC BLOOD PRESSURE: 130 MMHG | HEIGHT: 69 IN | BODY MASS INDEX: 24.44 KG/M2 | DIASTOLIC BLOOD PRESSURE: 72 MMHG | HEART RATE: 97 BPM

## 2025-06-17 DIAGNOSIS — G93.40 ENCEPHALOPATHY, UNSPECIFIED TYPE: Primary | ICD-10-CM

## 2025-06-17 DIAGNOSIS — F90.9 ATTENTION DEFICIT HYPERACTIVITY DISORDER (ADHD), UNSPECIFIED ADHD TYPE: ICD-10-CM

## 2025-06-17 PROCEDURE — 1036F TOBACCO NON-USER: CPT

## 2025-06-17 PROCEDURE — 3078F DIAST BP <80 MM HG: CPT

## 2025-06-17 PROCEDURE — 99214 OFFICE O/P EST MOD 30 MIN: CPT

## 2025-06-17 PROCEDURE — 3008F BODY MASS INDEX DOCD: CPT

## 2025-06-17 PROCEDURE — 3075F SYST BP GE 130 - 139MM HG: CPT

## 2025-06-17 RX ORDER — DEXTROAMPHETAMINE SACCHARATE, AMPHETAMINE ASPARTATE, DEXTROAMPHETAMINE SULFATE AND AMPHETAMINE SULFATE 5; 5; 5; 5 MG/1; MG/1; MG/1; MG/1
TABLET ORAL
Qty: 75 TABLET | Refills: 0 | Status: SHIPPED | OUTPATIENT
Start: 2025-06-17

## 2025-06-17 RX ORDER — DEXTROAMPHETAMINE SACCHARATE, AMPHETAMINE ASPARTATE, DEXTROAMPHETAMINE SULFATE AND AMPHETAMINE SULFATE 5; 5; 5; 5 MG/1; MG/1; MG/1; MG/1
TABLET ORAL
Qty: 75 TABLET | Refills: 0 | Status: SHIPPED | OUTPATIENT
Start: 2025-07-16

## 2025-06-17 RX ORDER — DEXTROAMPHETAMINE SACCHARATE, AMPHETAMINE ASPARTATE, DEXTROAMPHETAMINE SULFATE AND AMPHETAMINE SULFATE 5; 5; 5; 5 MG/1; MG/1; MG/1; MG/1
TABLET ORAL
Qty: 75 TABLET | Refills: 0 | Status: SHIPPED | OUTPATIENT
Start: 2025-08-16

## 2025-06-17 NOTE — PROGRESS NOTES
Malinda Rios is a 39 y.o.   male.  HPI  The patient is being seen today for their encephalopathy r/t their ADHD. They are currently taking Adderall 20mg BID and it has been effective. The patient can tell when the medication wears off. The patient agrees that their quality of life has improved while taking this medication. Patient denies any chest pain, heart palpitations, sleep issues, appetite changes, weight loss, and mood changes while taking this medication. I have reviewed the medications and the chart. Review of systems are negative unless otherwise specified in HPI.    Objective   Neurological Exam  Mental Status  Awake, alert and oriented to person, place and time. Speech is normal. Language is fluent with no aphasia. Attention and concentration are normal.    Cranial Nerves  CN III, IV, VI: Pupils equal round and reactive to light bilaterally.  And EOM's Normal.    Motor   Strength is 5/5 throughout all four extremities.    Sensory  Light touch is normal in upper and lower extremities.     Reflexes  Deep tendon reflexes are 2+ and symmetric in all four extremities.    Gait  Casual gait is normal including stance, stride, and arm swing.Normal toe walking. Normal heel walking.    Physical Exam  Eyes:      Pupils: Pupils are equal, round, and reactive to light.   Neurological:      Motor: Motor strength is normal.     Deep Tendon Reflexes: Reflexes are normal and symmetric.   Psychiatric:         Speech: Speech normal.           Assessment/Plan   #Encephalopathy in the setting of ADHD    Discussed following up in 3 months. Medication was sent to pharmacy. Discussed with the patient the purpose of medication, as well as potential side effects to be aware of. Informed the patient about abuse potential of medication and the importance adhering to the controlled substance agreement. Advised patient to call office with any adverse reaction to medication.

## 2025-07-13 ENCOUNTER — OFFICE VISIT (OUTPATIENT)
Dept: URGENT CARE | Age: 40
End: 2025-07-13
Payer: COMMERCIAL

## 2025-07-13 DIAGNOSIS — R49.0 HOARSE VOICE QUALITY: Primary | ICD-10-CM

## 2025-07-13 PROCEDURE — 99202 OFFICE O/P NEW SF 15 MIN: CPT

## 2025-07-13 NOTE — PROGRESS NOTES
Urgent Care Virtual Video Visit    Patient Location: Home  Provider Location: [facility]    39 year old M presents via virtual visit with concern for hoarse voice sore throat.  States he aspirated while drinking about a week ago.  He has sore throat and very hoarse voice.  Recommended in person exam for auscultation of chest and HEENT exam, strep testing as appropriate.  Patient agrees, plans to go to  urgent care near his home.     I have communicated my name and active licensure. Video visit completed with realtime synchronous video/audio connection. Informed consent was obtained from the patient. Patient was made aware that my evaluation and diagnosis are limited due to the fact that we are not in the same room during the interview and that this is a virtual encounter that took place via videoconferencing. Patient verbalized understanding.     Patient disposition: to in person clinic visit     Electronically signed by Virtual Urgent Care  5:35 PM

## 2025-07-15 ENCOUNTER — OFFICE VISIT (OUTPATIENT)
Dept: PRIMARY CARE | Facility: CLINIC | Age: 40
End: 2025-07-15
Payer: COMMERCIAL

## 2025-07-15 VITALS
HEIGHT: 69 IN | DIASTOLIC BLOOD PRESSURE: 80 MMHG | TEMPERATURE: 98.8 F | SYSTOLIC BLOOD PRESSURE: 140 MMHG | BODY MASS INDEX: 24.38 KG/M2 | HEART RATE: 95 BPM | WEIGHT: 164.6 LBS | OXYGEN SATURATION: 97 %

## 2025-07-15 DIAGNOSIS — R05.8 PRODUCTIVE COUGH: Primary | ICD-10-CM

## 2025-07-15 DIAGNOSIS — J04.0 LARYNGITIS: ICD-10-CM

## 2025-07-15 DIAGNOSIS — R49.0 DYSPHONIA: ICD-10-CM

## 2025-07-15 LAB — POC RAPID STREP: NEGATIVE

## 2025-07-15 PROCEDURE — 3008F BODY MASS INDEX DOCD: CPT | Performed by: INTERNAL MEDICINE

## 2025-07-15 PROCEDURE — 1036F TOBACCO NON-USER: CPT | Performed by: INTERNAL MEDICINE

## 2025-07-15 PROCEDURE — 3077F SYST BP >= 140 MM HG: CPT | Performed by: INTERNAL MEDICINE

## 2025-07-15 PROCEDURE — 87880 STREP A ASSAY W/OPTIC: CPT | Performed by: INTERNAL MEDICINE

## 2025-07-15 PROCEDURE — 3079F DIAST BP 80-89 MM HG: CPT | Performed by: INTERNAL MEDICINE

## 2025-07-15 PROCEDURE — 99213 OFFICE O/P EST LOW 20 MIN: CPT | Performed by: INTERNAL MEDICINE

## 2025-07-15 RX ORDER — AMOXICILLIN AND CLAVULANATE POTASSIUM 875; 125 MG/1; MG/1
875 TABLET, FILM COATED ORAL 2 TIMES DAILY
Qty: 20 TABLET | Refills: 0 | Status: SHIPPED | OUTPATIENT
Start: 2025-07-15 | End: 2025-07-25

## 2025-07-15 ASSESSMENT — ENCOUNTER SYMPTOMS
LOSS OF SENSATION IN FEET: 0
DEPRESSION: 0
OCCASIONAL FEELINGS OF UNSTEADINESS: 0

## 2025-07-15 NOTE — PROGRESS NOTES
"Internal Medicine Outpatient Visit  Chief Complaint   Patient presents with    Sick Visit     Lost voice 4 days ago. Has gotten worse. Throat harsh/ swollen. Cough yellow and green mucus.        HPI: Navin Rios is of 39 y.o. who is here for Internal Visit for the following issues:  Patient is seen today with chief complaint of cough productive of yellowish sputum for last 4 to 5 days.  He also has lost his voice.  He denies any fever or chill.  No shortness of breath or wheezing.  No chest pain or palpitation.  Has no nausea, vomiting or diarrhea.  Review of other systems are negative.    Surgical History[1]    Family History[2]      Medications Ordered Prior to Encounter[3]    Blood pressure 140/80, pulse 95, temperature 37.1 °C (98.8 °F), temperature source Temporal, height 1.753 m (5' 9\"), weight 74.7 kg (164 lb 9.6 oz), SpO2 97%.  Body mass index is 24.31 kg/m².  General examination: Mild discomfort due to upper respiratory symptoms  Eyes: There is no pallor or jaundice  Ears: Both external auditory canal and tympanic members are normal  Nose: Nasal mucosa is congested  Oral cavity and throat there is some pharyngeal congestion  Neck: There is no lymphadenopathy JVD  Lungs: Clear to auscultation  CVS: Normal exam    Office test for Streptococcus: Negative    Assessment and plan:    1. Productive cough/acute upper respiratory infection (Primary)  A course of Augmentin is prescribed  - POCT Rapid Strep A manually resulted    2. Dysphonia  Due to laryngitis: Advised to use some lozenges over-the-counter if needed  - POCT Rapid Strep A manually resulted    3. Laryngitis  Patient is advised to follow-up with his primary care physician if symptoms are persistent after the course of antibiotic.  - POCT Rapid Strep A manually resulted  - amoxicillin-clavulanate (Augmentin) 875-125 mg tablet; Take 1 tablet by mouth 2 times a day for 10 days.  Dispense: 20 tablet; Refill: 0                         [1]   Past Surgical " History:  Procedure Laterality Date    FRACTURE SURGERY  2018    OTHER SURGICAL HISTORY  08/09/2016    Oral Surgery   [2]   Family History  Problem Relation Name Age of Onset    Diabetes Mother Sumi Rios     Atrial fibrillation Mother Sumi Rios     Cancer Mother Sumi Rios     Early natural death Mother Sumi Rios     Hypertension Father Aubrey Rios     Heart disease Father Aubrey Rios    [3]   Current Outpatient Medications on File Prior to Visit   Medication Sig Dispense Refill    amphetamine-dextroamphetamine (Adderall) 20 mg tablet Take 1.5 tabs (30mg) by mouth in am and 1 tab (20mg) by mouth in afternoon. 75 tablet 0    [START ON 7/16/2025] amphetamine-dextroamphetamine (Adderall) 20 mg tablet Take 1.5 tabs (30mg) by mouth in am and 1 tab (20mg) by mouth in afternoon. Do not fill before July 16, 2025. 75 tablet 0    [START ON 8/16/2025] amphetamine-dextroamphetamine (Adderall) 20 mg tablet Take 1.5 tabs (30mg) by mouth in am and 1 tab (20mg) by mouth in afternoon. Do not fill before August 16, 2025. 75 tablet 0    celecoxib (CeleBREX) 200 mg capsule Take 1 capsule (200 mg) by mouth once daily. 90 capsule 1    melatonin 5 mg tablet Take 1 tablet (5 mg) by mouth once daily at bedtime.      metoprolol succinate XL (Toprol-XL) 50 mg 24 hr tablet Take 1 tablet (50 mg) by mouth 2 times a day. 180 tablet 1    naproxen (Naprosyn) 500 mg tablet TAKE 1 TABLET BY MOUTH EVERY DAY 90 tablet 0    PARoxetine (Paxil) 30 mg tablet Take 2 tablets (60 mg) by mouth once daily. AS DIRECTED. 180 tablet 1    temazepam (Restoril) 15 mg capsule Take 2 capsules (30 mg) by mouth as needed at bedtime for sleep. 60 capsule 2    triamcinolone (Kenalog) 0.1 % cream APPLY TOPICALLY 2 TIMES A DAY. APPLY TO AFFECTED AREA 1-2 TIMES DAILY AS NEEDED. 15 g 0     No current facility-administered medications on file prior to visit.

## 2025-07-23 ENCOUNTER — TELEPHONE (OUTPATIENT)
Dept: PRIMARY CARE | Facility: CLINIC | Age: 40
End: 2025-07-23
Payer: COMMERCIAL

## 2025-07-23 DIAGNOSIS — J04.0 LARYNGITIS: Primary | ICD-10-CM

## 2025-07-23 RX ORDER — AZITHROMYCIN 250 MG/1
TABLET, FILM COATED ORAL
Qty: 6 TABLET | Refills: 0 | Status: SHIPPED | OUTPATIENT
Start: 2025-07-23

## 2025-07-23 NOTE — TELEPHONE ENCOUNTER
Pt partner left message stating that pt was seen last week with Dr. Powers and his voice is not getting better. He stated that the antibiotic made him sick. He would like to know what they should do  Please advise

## 2025-07-30 ENCOUNTER — TELEPHONE (OUTPATIENT)
Dept: PRIMARY CARE | Facility: CLINIC | Age: 40
End: 2025-07-30
Payer: COMMERCIAL

## 2025-07-30 NOTE — TELEPHONE ENCOUNTER
Pt is still having a hard time with his voice and stated this is week 3. He would like to know if there is anything additional he can do. He suggested example steroids? Please advise

## 2025-07-31 ENCOUNTER — TELEPHONE (OUTPATIENT)
Dept: PRIMARY CARE | Facility: CLINIC | Age: 40
End: 2025-07-31
Payer: COMMERCIAL

## 2025-07-31 DIAGNOSIS — J04.0 LARYNGITIS: Primary | ICD-10-CM

## 2025-07-31 RX ORDER — METHYLPREDNISOLONE 4 MG/1
TABLET ORAL
Qty: 21 TABLET | Refills: 0 | Status: SHIPPED | OUTPATIENT
Start: 2025-07-31 | End: 2025-08-06

## 2025-08-26 DIAGNOSIS — G47.01 INSOMNIA DUE TO MEDICAL CONDITION: ICD-10-CM

## 2025-08-27 ENCOUNTER — APPOINTMENT (OUTPATIENT)
Dept: PRIMARY CARE | Facility: CLINIC | Age: 40
End: 2025-08-27
Payer: COMMERCIAL

## 2025-08-27 VITALS
WEIGHT: 162 LBS | BODY MASS INDEX: 23.99 KG/M2 | HEART RATE: 93 BPM | HEIGHT: 69 IN | DIASTOLIC BLOOD PRESSURE: 88 MMHG | TEMPERATURE: 97.8 F | SYSTOLIC BLOOD PRESSURE: 120 MMHG

## 2025-08-27 DIAGNOSIS — M25.569 KNEE PAIN, UNSPECIFIED CHRONICITY, UNSPECIFIED LATERALITY: ICD-10-CM

## 2025-08-27 DIAGNOSIS — G47.01 INSOMNIA DUE TO MEDICAL CONDITION: Primary | ICD-10-CM

## 2025-08-27 PROCEDURE — 3079F DIAST BP 80-89 MM HG: CPT | Performed by: FAMILY MEDICINE

## 2025-08-27 PROCEDURE — 3074F SYST BP LT 130 MM HG: CPT | Performed by: FAMILY MEDICINE

## 2025-08-27 PROCEDURE — 99214 OFFICE O/P EST MOD 30 MIN: CPT | Performed by: FAMILY MEDICINE

## 2025-08-27 PROCEDURE — 3008F BODY MASS INDEX DOCD: CPT | Performed by: FAMILY MEDICINE

## 2025-08-27 RX ORDER — TEMAZEPAM 15 MG/1
30 CAPSULE ORAL NIGHTLY PRN
Qty: 15 CAPSULE | Refills: 11 | OUTPATIENT
Start: 2025-08-27

## 2025-08-27 RX ORDER — TEMAZEPAM 15 MG/1
30 CAPSULE ORAL NIGHTLY PRN
Qty: 60 CAPSULE | Refills: 2 | Status: SHIPPED | OUTPATIENT
Start: 2025-08-27 | End: 2025-11-25

## 2025-08-27 RX ORDER — CELECOXIB 200 MG/1
200 CAPSULE ORAL DAILY
Qty: 90 CAPSULE | Refills: 1 | Status: SHIPPED | OUTPATIENT
Start: 2025-08-27 | End: 2026-02-23

## 2025-09-02 DIAGNOSIS — L30.9 ECZEMA, UNSPECIFIED TYPE: ICD-10-CM

## 2025-09-03 RX ORDER — TRIAMCINOLONE ACETONIDE 1 MG/G
CREAM TOPICAL 2 TIMES DAILY
Qty: 15 G | Refills: 0 | Status: SHIPPED | OUTPATIENT
Start: 2025-09-03

## 2025-09-10 ENCOUNTER — APPOINTMENT (OUTPATIENT)
Dept: NEUROLOGY | Facility: CLINIC | Age: 40
End: 2025-09-10
Payer: COMMERCIAL